# Patient Record
Sex: MALE | Race: ASIAN | NOT HISPANIC OR LATINO | Employment: FULL TIME | URBAN - METROPOLITAN AREA
[De-identification: names, ages, dates, MRNs, and addresses within clinical notes are randomized per-mention and may not be internally consistent; named-entity substitution may affect disease eponyms.]

---

## 2017-01-23 ENCOUNTER — LAB VISIT (OUTPATIENT)
Dept: LAB | Facility: HOSPITAL | Age: 67
End: 2017-01-23
Attending: FAMILY MEDICINE
Payer: MEDICARE

## 2017-01-23 DIAGNOSIS — N41.9 PROSTATITIS, UNSPECIFIED PROSTATITIS TYPE: ICD-10-CM

## 2017-01-23 LAB
BILIRUB UR QL STRIP: NEGATIVE
CLARITY UR: CLEAR
COLOR UR: YELLOW
GLUCOSE UR QL STRIP: NEGATIVE
HGB UR QL STRIP: NEGATIVE
KETONES UR QL STRIP: ABNORMAL
LEUKOCYTE ESTERASE UR QL STRIP: NEGATIVE
NITRITE UR QL STRIP: NEGATIVE
PH UR STRIP: 6 [PH] (ref 5–8)
PROT UR QL STRIP: NEGATIVE
SP GR UR STRIP: 1.02 (ref 1–1.03)
URN SPEC COLLECT METH UR: ABNORMAL

## 2017-01-23 PROCEDURE — 81003 URINALYSIS AUTO W/O SCOPE: CPT | Mod: PO

## 2017-01-23 PROCEDURE — 87086 URINE CULTURE/COLONY COUNT: CPT

## 2017-01-23 PROCEDURE — 87077 CULTURE AEROBIC IDENTIFY: CPT

## 2017-01-23 PROCEDURE — 87088 URINE BACTERIA CULTURE: CPT

## 2017-01-23 PROCEDURE — 87186 SC STD MICRODIL/AGAR DIL: CPT

## 2017-01-25 LAB — BACTERIA UR CULT: NORMAL

## 2017-01-26 ENCOUNTER — PATIENT MESSAGE (OUTPATIENT)
Dept: INTERNAL MEDICINE | Facility: CLINIC | Age: 67
End: 2017-01-26

## 2017-01-27 ENCOUNTER — PATIENT MESSAGE (OUTPATIENT)
Dept: INTERNAL MEDICINE | Facility: CLINIC | Age: 67
End: 2017-01-27

## 2017-01-27 DIAGNOSIS — N39.0 URINARY TRACT INFECTION WITHOUT HEMATURIA, SITE UNSPECIFIED: Primary | ICD-10-CM

## 2017-01-27 RX ORDER — AMOXICILLIN 875 MG/1
875 TABLET, FILM COATED ORAL 2 TIMES DAILY
Qty: 60 TABLET | Refills: 0 | Status: SHIPPED | OUTPATIENT
Start: 2017-01-27 | End: 2017-02-26

## 2017-01-27 NOTE — TELEPHONE ENCOUNTER
Please notify still urinary infxn evident resistant to cipro  Amoxicillin erxd (bacteria is enterococcus)  Aster ua cult one month

## 2017-02-06 RX ORDER — TADALAFIL 2.5 MG/1
1 TABLET ORAL DAILY
Qty: 90 EACH | Refills: 3 | Status: SHIPPED | OUTPATIENT
Start: 2017-02-06 | End: 2017-02-10 | Stop reason: SDUPTHER

## 2017-02-08 ENCOUNTER — PATIENT MESSAGE (OUTPATIENT)
Dept: INTERNAL MEDICINE | Facility: CLINIC | Age: 67
End: 2017-02-08

## 2017-02-10 RX ORDER — TADALAFIL 2.5 MG/1
1 TABLET ORAL DAILY
Qty: 90 EACH | Refills: 3 | Status: SHIPPED | OUTPATIENT
Start: 2017-02-10 | End: 2019-08-15

## 2017-02-27 ENCOUNTER — LAB VISIT (OUTPATIENT)
Dept: LAB | Facility: HOSPITAL | Age: 67
End: 2017-02-27
Attending: FAMILY MEDICINE
Payer: MEDICARE

## 2017-02-27 DIAGNOSIS — N39.0 URINARY TRACT INFECTION WITHOUT HEMATURIA, SITE UNSPECIFIED: ICD-10-CM

## 2017-02-27 LAB
BILIRUB UR QL STRIP: NEGATIVE
CLARITY UR: CLEAR
COLOR UR: YELLOW
GLUCOSE UR QL STRIP: NEGATIVE
HGB UR QL STRIP: NEGATIVE
KETONES UR QL STRIP: ABNORMAL
LEUKOCYTE ESTERASE UR QL STRIP: NEGATIVE
NITRITE UR QL STRIP: NEGATIVE
PH UR STRIP: 6 [PH] (ref 5–8)
PROT UR QL STRIP: ABNORMAL
SP GR UR STRIP: 1.02 (ref 1–1.03)
URN SPEC COLLECT METH UR: ABNORMAL

## 2017-02-27 PROCEDURE — 81003 URINALYSIS AUTO W/O SCOPE: CPT | Mod: PO

## 2017-02-27 PROCEDURE — 87086 URINE CULTURE/COLONY COUNT: CPT

## 2017-02-28 LAB — BACTERIA UR CULT: NO GROWTH

## 2017-04-10 ENCOUNTER — LAB VISIT (OUTPATIENT)
Dept: LAB | Facility: HOSPITAL | Age: 67
End: 2017-04-10
Attending: FAMILY MEDICINE
Payer: MEDICARE

## 2017-04-10 DIAGNOSIS — D50.9 IRON DEFICIENCY ANEMIA, UNSPECIFIED: ICD-10-CM

## 2017-04-10 DIAGNOSIS — E55.9 VITAMIN D DEFICIENCY: ICD-10-CM

## 2017-04-10 LAB
25(OH)D3+25(OH)D2 SERPL-MCNC: 18 NG/ML
BASOPHILS # BLD AUTO: 0.04 K/UL
BASOPHILS NFR BLD: 0.6 %
DIFFERENTIAL METHOD: ABNORMAL
EOSINOPHIL # BLD AUTO: 0.2 K/UL
EOSINOPHIL NFR BLD: 3.3 %
ERYTHROCYTE [DISTWIDTH] IN BLOOD BY AUTOMATED COUNT: 14.8 %
FERRITIN SERPL-MCNC: 17 NG/ML
HCT VFR BLD AUTO: 40.8 %
HGB BLD-MCNC: 13.3 G/DL
LYMPHOCYTES # BLD AUTO: 2.8 K/UL
LYMPHOCYTES NFR BLD: 44.1 %
MCH RBC QN AUTO: 25.1 PG
MCHC RBC AUTO-ENTMCNC: 32.6 %
MCV RBC AUTO: 77 FL
MONOCYTES # BLD AUTO: 0.5 K/UL
MONOCYTES NFR BLD: 7.1 %
NEUTROPHILS # BLD AUTO: 2.8 K/UL
NEUTROPHILS NFR BLD: 44.9 %
PLATELET # BLD AUTO: 171 K/UL
PMV BLD AUTO: 10.8 FL
RBC # BLD AUTO: 5.29 M/UL
WBC # BLD AUTO: 6.31 K/UL

## 2017-04-10 PROCEDURE — 85025 COMPLETE CBC W/AUTO DIFF WBC: CPT

## 2017-04-10 PROCEDURE — 82306 VITAMIN D 25 HYDROXY: CPT

## 2017-04-10 PROCEDURE — 82728 ASSAY OF FERRITIN: CPT

## 2017-04-10 PROCEDURE — 36415 COLL VENOUS BLD VENIPUNCTURE: CPT | Mod: PO

## 2017-07-11 ENCOUNTER — OFFICE VISIT (OUTPATIENT)
Dept: INTERNAL MEDICINE | Facility: CLINIC | Age: 67
End: 2017-07-11
Payer: MEDICARE

## 2017-07-11 VITALS
WEIGHT: 195.75 LBS | DIASTOLIC BLOOD PRESSURE: 78 MMHG | SYSTOLIC BLOOD PRESSURE: 124 MMHG | OXYGEN SATURATION: 98 % | HEIGHT: 69 IN | TEMPERATURE: 96 F | HEART RATE: 75 BPM | BODY MASS INDEX: 28.99 KG/M2

## 2017-07-11 DIAGNOSIS — E55.9 VITAMIN D DEFICIENCY: ICD-10-CM

## 2017-07-11 DIAGNOSIS — Z12.5 SCREENING FOR PROSTATE CANCER: ICD-10-CM

## 2017-07-11 DIAGNOSIS — M25.562 LEFT KNEE PAIN, UNSPECIFIED CHRONICITY: ICD-10-CM

## 2017-07-11 DIAGNOSIS — D50.9 IRON DEFICIENCY ANEMIA, UNSPECIFIED: ICD-10-CM

## 2017-07-11 DIAGNOSIS — I10 ESSENTIAL HYPERTENSION: Primary | ICD-10-CM

## 2017-07-11 DIAGNOSIS — E78.5 HYPERLIPIDEMIA, UNSPECIFIED HYPERLIPIDEMIA TYPE: ICD-10-CM

## 2017-07-11 PROCEDURE — 99214 OFFICE O/P EST MOD 30 MIN: CPT | Mod: S$PBB,,, | Performed by: FAMILY MEDICINE

## 2017-07-11 PROCEDURE — 99213 OFFICE O/P EST LOW 20 MIN: CPT | Mod: PBBFAC,PO | Performed by: FAMILY MEDICINE

## 2017-07-11 PROCEDURE — 1126F AMNT PAIN NOTED NONE PRSNT: CPT | Mod: ,,, | Performed by: FAMILY MEDICINE

## 2017-07-11 PROCEDURE — 99999 PR PBB SHADOW E&M-EST. PATIENT-LVL III: CPT | Mod: PBBFAC,,, | Performed by: FAMILY MEDICINE

## 2017-07-11 PROCEDURE — 1159F MED LIST DOCD IN RCRD: CPT | Mod: ,,, | Performed by: FAMILY MEDICINE

## 2017-07-11 RX ORDER — ESOMEPRAZOLE MAGNESIUM 40 MG/1
40 CAPSULE, DELAYED RELEASE ORAL
Qty: 90 CAPSULE | Refills: 3 | Status: SHIPPED | OUTPATIENT
Start: 2017-07-11 | End: 2018-09-11 | Stop reason: SDUPTHER

## 2017-07-11 RX ORDER — TAMSULOSIN HYDROCHLORIDE 0.4 MG/1
0.4 CAPSULE ORAL DAILY
Qty: 90 CAPSULE | Refills: 3 | Status: SHIPPED | OUTPATIENT
Start: 2017-07-11 | End: 2018-09-11 | Stop reason: SDUPTHER

## 2017-07-11 RX ORDER — LOSARTAN POTASSIUM 50 MG/1
50 TABLET ORAL DAILY
Qty: 90 TABLET | Refills: 3 | Status: SHIPPED | OUTPATIENT
Start: 2017-07-11 | End: 2018-09-11 | Stop reason: SDUPTHER

## 2017-07-11 RX ORDER — PRAVASTATIN SODIUM 20 MG/1
20 TABLET ORAL DAILY
Qty: 90 TABLET | Refills: 3 | Status: SHIPPED | OUTPATIENT
Start: 2017-07-11 | End: 2018-09-11 | Stop reason: SDUPTHER

## 2017-07-11 RX ORDER — MOMETASONE FUROATE 50 UG/1
2 SPRAY, METERED NASAL DAILY
Qty: 3 EACH | Refills: 3 | Status: SHIPPED | OUTPATIENT
Start: 2017-07-11 | End: 2018-09-11

## 2017-07-11 RX ORDER — DILTIAZEM HYDROCHLORIDE 240 MG/1
240 CAPSULE, EXTENDED RELEASE ORAL DAILY
Qty: 90 CAPSULE | Refills: 3 | Status: SHIPPED | OUTPATIENT
Start: 2017-07-11 | End: 2018-09-11 | Stop reason: SDUPTHER

## 2017-07-11 NOTE — PROGRESS NOTES
Subjective:       Patient ID: Eduardo Krishnamurthy is a . male.    Chief Complaint:Multiple issues see below      HPI Hypertension: blood pressures at home normal. Tolerating medicine.   Iron def anemia sec jejunal erosions off nsaids ;;  GERD asympt. Tolerating medication. No swallowing problems  All rhin: infreq nasonex and zyrtec  Vit d defic: off weekly vit d ;wants to do otc. D/wd inc to 2000 daily    Prostatitis sympt resolved    Some wt gain /left knee pain and fatigue but rsume stationary bike few weeks ago and already improving    Past Medical History   Diagnosis Date    Hypertension     Hyperlipidemia     GERD (gastroesophageal reflux disease)     BPH (benign prostatic hyperplasia)     Colon polyp     Iron deficiency anemia, unspecified      jejunal etc erosions 4/15     Past Surgical History   Procedure Laterality Date    Knee surgery       right     History reviewed. No pertinent family history.      Review of Systems  Cardiovascular: no chest pain  Chest: no shortness of breath  Abd: no abd pain  Remainder review of systems negative    Objective:      Physical Exam   Constitutional: He is oriented to person, place, and time. He appears well-developed and well-nourished.   HENT:   Head: Normocephalic and atraumatic.   Right Ear: External ear normal.   Left Ear: External ear normal.   Nose: Nose normal.   Mouth/Throat: Oropharynx is clear and moist.   Nl tms   Eyes: Conjunctivae and EOM are normal. Pupils are equal, round, and reactive to light. No scleral icterus.   Neck: Normal range of motion. Neck supple. Carotid bruit is not present.   Cardiovascular: Normal rate, regular rhythm and normal heart sounds.  Exam reveals no gallop and no friction rub.    No murmur heard.  Pulmonary/Chest: Effort normal and breath sounds normal. He has no wheezes.   Abdominal: Soft. Bowel sounds are normal. He exhibits no distension and no mass. There is no hepatosplenomegaly. There is no tenderness. There is no rebound  and no guarding.   Musculoskeletal: Normal range of motion. He exhibits no tenderness.   Lymphadenopathy:     He has no cervical adenopathy.   Neurological: He is alert and oriented to person, place, and time. No cranial nerve deficit. Coordination normal.   Skin: Skin is warm and dry. No rash noted. No erythema.   Psychiatric: He has a normal mood and affect. His behavior is normal. Judgment and thought content normal.   Nursing note and vitals reviewed.  bilat knees no effusion. Nl rom. nontend   Assessment:       1. Left knee pain   2. Iron deficiency anemia    3. Hyperlipidemia    4. Essential hypertension        Plan:     Increase vitamin D over counter to 2000 units daily  Shingles vaccine via pharmacy    **F/u per lab few months  Cont exer and notify if not back to nl   Essential hypertension  -     Lipid panel; Future; Expected date: 10/09/2017  -     Basic metabolic panel; Future; Expected date: 10/09/2017    Hyperlipidemia, unspecified hyperlipidemia type  -     pravastatin (PRAVACHOL) 20 MG tablet; Take 1 tablet (20 mg total) by mouth once daily.  Dispense: 90 tablet; Refill: 3    Iron deficiency anemia, unspecified  -     CBC auto differential; Future; Expected date: 07/11/2017  -     Ferritin; Future; Expected date: 07/11/2017    Left knee pain, unspecified chronicity    Screening for prostate cancer  -     PSA, Screening; Future; Expected date: 10/09/2017    Vitamin D deficiency  -     Vitamin D; Future; Expected date: 10/09/2017    Other orders  -     tamsulosin (FLOMAX) 0.4 mg Cp24; Take 1 capsule (0.4 mg total) by mouth once daily.  Dispense: 90 capsule; Refill: 3  -     mometasone (NASONEX) 50 mcg/actuation nasal spray; 2 sprays by Nasal route once daily.  Dispense: 3 each; Refill: 3  -     losartan (COZAAR) 50 MG tablet; Take 1 tablet (50 mg total) by mouth once daily.  Dispense: 90 tablet; Refill: 3  -     esomeprazole (NEXIUM) 40 MG capsule; Take 1 capsule (40 mg total) by mouth before  breakfast.  Dispense: 90 capsule; Refill: 3  -     diltiaZEM (TIAZAC) 240 MG CpSR; Take 1 capsule (240 mg total) by mouth once daily.  Dispense: 90 capsule; Refill: 3

## 2017-09-29 ENCOUNTER — PATIENT MESSAGE (OUTPATIENT)
Dept: INTERNAL MEDICINE | Facility: CLINIC | Age: 67
End: 2017-09-29

## 2017-09-29 DIAGNOSIS — M25.569 KNEE PAIN, UNSPECIFIED CHRONICITY, UNSPECIFIED LATERALITY: Primary | ICD-10-CM

## 2017-10-11 ENCOUNTER — LAB VISIT (OUTPATIENT)
Dept: LAB | Facility: HOSPITAL | Age: 67
End: 2017-10-11
Attending: FAMILY MEDICINE
Payer: MEDICARE

## 2017-10-11 DIAGNOSIS — Z12.5 SCREENING FOR PROSTATE CANCER: ICD-10-CM

## 2017-10-11 DIAGNOSIS — D50.9 IRON DEFICIENCY ANEMIA: ICD-10-CM

## 2017-10-11 DIAGNOSIS — E55.9 VITAMIN D DEFICIENCY: ICD-10-CM

## 2017-10-11 DIAGNOSIS — I10 ESSENTIAL HYPERTENSION: ICD-10-CM

## 2017-10-11 LAB
25(OH)D3+25(OH)D2 SERPL-MCNC: 20 NG/ML
ANION GAP SERPL CALC-SCNC: 6 MMOL/L
BASOPHILS # BLD AUTO: 0.04 K/UL
BASOPHILS NFR BLD: 0.7 %
BUN SERPL-MCNC: 10 MG/DL
CALCIUM SERPL-MCNC: 9.2 MG/DL
CHLORIDE SERPL-SCNC: 104 MMOL/L
CHOLEST SERPL-MCNC: 166 MG/DL
CHOLEST/HDLC SERPL: 2.9 {RATIO}
CO2 SERPL-SCNC: 26 MMOL/L
COMPLEXED PSA SERPL-MCNC: 1 NG/ML
CREAT SERPL-MCNC: 1 MG/DL
DIFFERENTIAL METHOD: ABNORMAL
EOSINOPHIL # BLD AUTO: 0.1 K/UL
EOSINOPHIL NFR BLD: 2.1 %
ERYTHROCYTE [DISTWIDTH] IN BLOOD BY AUTOMATED COUNT: 15.1 %
EST. GFR  (AFRICAN AMERICAN): >60 ML/MIN/1.73 M^2
EST. GFR  (NON AFRICAN AMERICAN): >60 ML/MIN/1.73 M^2
FERRITIN SERPL-MCNC: 20 NG/ML
GLUCOSE SERPL-MCNC: 91 MG/DL
HCT VFR BLD AUTO: 40.9 %
HDLC SERPL-MCNC: 57 MG/DL
HDLC SERPL: 34.3 %
HGB BLD-MCNC: 13.3 G/DL
LDLC SERPL CALC-MCNC: 84 MG/DL
LYMPHOCYTES # BLD AUTO: 2.5 K/UL
LYMPHOCYTES NFR BLD: 44.4 %
MCH RBC QN AUTO: 24.9 PG
MCHC RBC AUTO-ENTMCNC: 32.5 G/DL
MCV RBC AUTO: 76 FL
MONOCYTES # BLD AUTO: 0.5 K/UL
MONOCYTES NFR BLD: 8.2 %
NEUTROPHILS # BLD AUTO: 2.5 K/UL
NEUTROPHILS NFR BLD: 44.6 %
NONHDLC SERPL-MCNC: 109 MG/DL
PLATELET # BLD AUTO: 189 K/UL
PMV BLD AUTO: 11 FL
POTASSIUM SERPL-SCNC: 4.6 MMOL/L
RBC # BLD AUTO: 5.35 M/UL
SODIUM SERPL-SCNC: 136 MMOL/L
TRIGL SERPL-MCNC: 125 MG/DL
WBC # BLD AUTO: 5.61 K/UL

## 2017-10-11 PROCEDURE — 85025 COMPLETE CBC W/AUTO DIFF WBC: CPT

## 2017-10-11 PROCEDURE — 80061 LIPID PANEL: CPT

## 2017-10-11 PROCEDURE — 84153 ASSAY OF PSA TOTAL: CPT

## 2017-10-11 PROCEDURE — 82306 VITAMIN D 25 HYDROXY: CPT

## 2017-10-11 PROCEDURE — 36415 COLL VENOUS BLD VENIPUNCTURE: CPT | Mod: PO

## 2017-10-11 PROCEDURE — 82728 ASSAY OF FERRITIN: CPT

## 2017-10-11 PROCEDURE — 80048 BASIC METABOLIC PNL TOTAL CA: CPT

## 2017-10-25 DIAGNOSIS — M25.562 ACUTE PAIN OF LEFT KNEE: Primary | ICD-10-CM

## 2017-10-26 ENCOUNTER — OFFICE VISIT (OUTPATIENT)
Dept: ORTHOPEDICS | Facility: CLINIC | Age: 67
End: 2017-10-26
Payer: MEDICARE

## 2017-10-26 ENCOUNTER — HOSPITAL ENCOUNTER (OUTPATIENT)
Dept: RADIOLOGY | Facility: HOSPITAL | Age: 67
Discharge: HOME | End: 2017-10-26
Attending: ORTHOPAEDIC SURGERY
Payer: MEDICARE

## 2017-10-26 VITALS
RESPIRATION RATE: 17 BRPM | DIASTOLIC BLOOD PRESSURE: 73 MMHG | HEIGHT: 69 IN | HEART RATE: 55 BPM | SYSTOLIC BLOOD PRESSURE: 106 MMHG | WEIGHT: 189 LBS | BODY MASS INDEX: 27.99 KG/M2

## 2017-10-26 DIAGNOSIS — M25.462 BILATERAL KNEE EFFUSIONS: ICD-10-CM

## 2017-10-26 DIAGNOSIS — M25.561 CHRONIC PAIN OF BOTH KNEES: Primary | ICD-10-CM

## 2017-10-26 DIAGNOSIS — M25.461 BILATERAL KNEE EFFUSIONS: ICD-10-CM

## 2017-10-26 DIAGNOSIS — M65.9 SYNOVITIS OF KNEE: ICD-10-CM

## 2017-10-26 DIAGNOSIS — M17.9 OSTEOARTHRITIS OF KNEE, UNSPECIFIED (CODE): ICD-10-CM

## 2017-10-26 DIAGNOSIS — M17.0 BILATERAL PRIMARY OSTEOARTHRITIS OF KNEE: ICD-10-CM

## 2017-10-26 DIAGNOSIS — M25.562 CHRONIC PAIN OF BOTH KNEES: Primary | ICD-10-CM

## 2017-10-26 DIAGNOSIS — M25.562 ACUTE PAIN OF LEFT KNEE: ICD-10-CM

## 2017-10-26 DIAGNOSIS — G89.29 CHRONIC PAIN OF BOTH KNEES: Primary | ICD-10-CM

## 2017-10-26 PROCEDURE — 99213 OFFICE O/P EST LOW 20 MIN: CPT | Mod: PBBFAC,25,PO | Performed by: ORTHOPAEDIC SURGERY

## 2017-10-26 PROCEDURE — 99204 OFFICE O/P NEW MOD 45 MIN: CPT | Mod: S$PBB,,, | Performed by: ORTHOPAEDIC SURGERY

## 2017-10-26 PROCEDURE — 73562 X-RAY EXAM OF KNEE 3: CPT | Mod: TC,50,PO

## 2017-10-26 PROCEDURE — 99999 PR PBB SHADOW E&M-EST. PATIENT-LVL III: CPT | Mod: PBBFAC,,, | Performed by: ORTHOPAEDIC SURGERY

## 2017-10-26 PROCEDURE — 73562 X-RAY EXAM OF KNEE 3: CPT | Mod: 26,50,, | Performed by: RADIOLOGY

## 2017-10-26 RX ORDER — MELOXICAM 15 MG/1
15 TABLET ORAL DAILY
Qty: 30 TABLET | Refills: 2 | Status: SHIPPED | OUTPATIENT
Start: 2017-10-26 | End: 2018-02-22 | Stop reason: SDUPTHER

## 2017-10-26 NOTE — LETTER
October 26, 2017      Shreyas Duval MD  [address]  [place] [address]           [facility]  [address] [place] [address]  Phone: [phone]  Fax: [phone]          Patient: Eduardo Krishnamurthy   MR Number: [id number]   YOB: 1950   Date of Visit: 10/26/2017       Dear Dr. Shreyas Duval:    Thank you for referring Eduardo Krishnamurthy to me for evaluation. Attached you will find relevant portions of my assessment and plan of care.    If you have questions, please do not hesitate to call me. I look forward to following Eduardo Krishnamurthy along with you.    Sincerely,    Laura Mckeon LPN    Enclosure  CC:  No Recipients    If you would like to receive this communication electronically, please contact [email or web address] or [phone] to request more information on [email or web address] Link access.    For providers and/or their staff who would like to refer a patient to [facility], please contact us through our one-stop-shop provider referral line, [facility], at [phone].    If you feel you have received this communication in error or would no longer like to receive these types of communications, please e-mail [email or web address]

## 2017-10-27 PROBLEM — M25.462 BILATERAL KNEE EFFUSIONS: Status: ACTIVE | Noted: 2017-10-27

## 2017-10-27 PROBLEM — M25.561 CHRONIC PAIN OF BOTH KNEES: Status: ACTIVE | Noted: 2017-10-27

## 2017-10-27 PROBLEM — M25.461 BILATERAL KNEE EFFUSIONS: Status: ACTIVE | Noted: 2017-10-27

## 2017-10-27 PROBLEM — G89.29 CHRONIC PAIN OF BOTH KNEES: Status: ACTIVE | Noted: 2017-10-27

## 2017-10-27 PROBLEM — M17.0 BILATERAL PRIMARY OSTEOARTHRITIS OF KNEE: Status: ACTIVE | Noted: 2017-10-27

## 2017-10-27 PROBLEM — M25.562 CHRONIC PAIN OF BOTH KNEES: Status: ACTIVE | Noted: 2017-10-27

## 2017-10-27 PROBLEM — M17.9 OSTEOARTHRITIS OF KNEE, UNSPECIFIED (CODE): Status: ACTIVE | Noted: 2017-10-27

## 2017-10-27 PROBLEM — M65.9 SYNOVITIS OF KNEE: Status: ACTIVE | Noted: 2017-10-27

## 2017-10-27 NOTE — PROGRESS NOTES
CC: This is a 66-year-old male that complains of knee pain.    HPI: The patient has a long history of bilateral knee pain.  He does have a history of arthritis as well.  He has taken anti-inflammatories in the past.  He denies any complications related to taking Mobic.  The patient states that the pain is a 7 out of 10.    PMH:    Past Medical History:   Diagnosis Date    BPH (benign prostatic hyperplasia)     Colon polyp     GERD (gastroesophageal reflux disease)     Hyperlipidemia     Hypertension     Iron deficiency anemia, unspecified     jejunal etc erosions 4/15       PSH:    Past Surgical History:   Procedure Laterality Date    KNEE SURGERY      right       Family Hx:  No family history on file.    Allergy:    Review of patient's allergies indicates:   Allergen Reactions    Nsaids (non-steroidal anti-inflammatory drug)      Jejunal erosions       Medication:    Current Outpatient Prescriptions:     diltiaZEM (TIAZAC) 240 MG CpSR, Take 1 capsule (240 mg total) by mouth once daily., Disp: 90 capsule, Rfl: 3    ergocalciferol (VITAMIN D2) 50,000 unit Cap, Take 50,000 Units by mouth every 7 days., Disp: , Rfl:     esomeprazole (NEXIUM) 40 MG capsule, Take 1 capsule (40 mg total) by mouth before breakfast., Disp: 90 capsule, Rfl: 3    ferrous gluconate (FERGON) 325 MG Tab, Take 1 tablet (325 mg total) by mouth daily with breakfast., Disp: 90 tablet, Rfl: 1    losartan (COZAAR) 50 MG tablet, Take 1 tablet (50 mg total) by mouth once daily., Disp: 90 tablet, Rfl: 3    mometasone (NASONEX) 50 mcg/actuation nasal spray, 2 sprays by Nasal route once daily., Disp: 3 each, Rfl: 3    pravastatin (PRAVACHOL) 20 MG tablet, Take 1 tablet (20 mg total) by mouth once daily., Disp: 90 tablet, Rfl: 3    tadalafil (CIALIS) 2.5 mg Tab, Take 1 tablet by mouth once daily., Disp: 90 each, Rfl: 3    tamsulosin (FLOMAX) 0.4 mg Cp24, Take 1 capsule (0.4 mg total) by mouth once daily., Disp: 90 capsule, Rfl: 3     "meloxicam (MOBIC) 15 MG tablet, Take 1 tablet (15 mg total) by mouth once daily., Disp: 30 tablet, Rfl: 2    Current Facility-Administered Medications:     hylan g-f 20 48 mg/6 mL injection 48 mg, 48 mg, Intra-articular, 1 time in Clinic/HOD, Troy Becerra Sr., MD    hylan g-f 20 48 mg/6 mL injection 48 mg, 48 mg, Intra-articular, 1 time in Clinic/HOD, Troy Becerra Sr., MD    Social History:    Social History     Social History    Marital status:      Spouse name: N/A    Number of children: 3    Years of education: N/A     Occupational History    Not on file.     Social History Main Topics    Smoking status: Never Smoker    Smokeless tobacco: Never Used    Alcohol use No    Drug use: No    Sexual activity: No     Other Topics Concern    Not on file     Social History Narrative    [place] moved here 2014;         Vitals:   /73   Pulse (!) 55   Resp 17   Ht 5' 9" (1.753 m)   Wt 85.7 kg (189 lb)   BMI 27.91 kg/m²      ROS:  GENERAL: No fever, chills, fatigability or weight loss.  SKIN: No rashes, itching or changes in color or texture of skin.  HEAD: No headaches or recent head trauma.  EYES: Visual acuity fine. No photophobia, ocular pain or diplopia.  EARS: Denies ear pain, discharge or vertigo.  NOSE: No loss of smell, no epistaxis or postnasal drip.  MOUTH & THROAT: No hoarseness or change in voice. No excessive gum bleeding.  NODES: Denies swollen glands.  CHEST: Denies BISHOP, cyanosis, wheezing, cough and sputum production.  CARDIOVASCULAR: Denies chest pain, PND, orthopnea or reduced exercise tolerance.  ABDOMEN: Appetite fine. No weight loss. Denies diarrhea, abdominal pain, hematemesis or blood in stool.  URINARY: No flank pain, dysuria or hematuria.  PERIPHERAL VASCULAR: No claudication or cyanosis.  NEUROLOGIC: No history of seizures, paralysis, alteration of gait or coordination.  MUSCULOSKELETAL: See HPI    PE:  APPEARANCE: Well nourished, well developed, " in no acute distress.   HEAD: Normocephalic, atraumatic.  EYES: PERRL. EOMI.   EARS: TM's intact. Light reflex normal. No retraction or perforation.   NOSE: Mucosa pink. Airway clear.  MOUTH & THROAT: No tonsillar enlargement. No pharyngeal erythema or exudate. No stridor.  NECK: Supple.   NODES: No cervical, axillary or inguinal lymph node enlargement.  CHEST: Lungs clear to auscultation.  CARDIOVASCULAR: Normal S1, S2. No rubs, murmurs or gallops.  ABDOMEN: Bowel sounds normal. Not distended. Soft. No tenderness or masses.  NEUROLOGIC: Cranial Nerves: II-XII grossly intact, also see MUSCULOSKELETAL  MUSCULOSKELETAL:        left Knee Exam-abnormal    Gait-abnormal  Muscle Appearance:abnormal  Grooming:normal  Spine Alignment-normal  Muscle Atrophy-Positive  Deformities-Negative  Tenderness-Positive  Paresthesias-Negative  Range of Motion         Ext-normal, 0 degrees         Flex-abnormal  Muscle Strength-abnormal  Sensation-normal  Reflexes-normal  Crepitus-Positive                                Swelling-Negative  Effusion- Positive                                Edema-Negative  Lachman-Negative                                Erythema-Negative  Rolanda's-Positive                              Apley Grind-Positive  Patellar Comp-Positive                         Alignment-normal/symmetric  Patellar Apprehension-Negative              Synovial fullness-Positive  Passive Patellar Tilt-normal  Patellar Tracking-normal   Patellar Glide-normal  Q-Angle at 90 degrees-normal  Patellar Grind-abnormal  [id number]  Fatigue-Negative                                     HS Tightness-Negative  Tests on Exam, No ligamentous laxity  Neurovascular Status-normal+2 DP and PT artery pulses  Skin-normal         Right  Knee Exam-abnormal    Gait-abnormal  Muscle Appearance:abnormal  Grooming:normal  Spine Alignment-normal  Muscle Atrophy-Positive  Deformities-Negative  Tenderness-Positive  Paresthesias-Negative  Range of Motion          Ext-normal, 0 degrees         Flex-abnormal  Muscle Strength-abnormal  Sensation-normal  Reflexes-normal  Crepitus-Positive                                Swelling-Negative  Effusion- Positive                                Edema-Negative  Lachman-Negative                                Erythema-Negative  Rolanda's-Positive                              Apley Grind-Positive  Patellar Comp-Positive                         Alignment-normal/symmetric  Patellar Apprehension-Negative              Synovial fullness-Positive  Passive Patellar Tilt-normal  Patellar Tracking-normal   Patellar Glide-normal  Q-Angle at 90 degrees-normal  Patellar Grind-abnormal  [id number]  Fatigue-Negative                                     HS Tightness-Negative  Tests on Exam, No ligamentous laxity  Neurovascular Status-normal+2 DP and PT artery pulses  Skin-normal    Assessment:           Diagnosis:              1.  Bilateral knee arthritis               2.  Bilateral knee effusion               3.  Bilateral knee synovitis                   Diagnostic Studies  MRI-No  X-Ray-No  EMG/NCV-No  Arthrogram-No  Bone Scan-No  CT Scan-No  Doppler-No  ESR-No  CRP-No  CBC with Diff-No   Rheumatoid/Arthritis Panel-No      Plan:                                                 1. PT-yes                                                 2.OT-no                                          3.NSAID-yes                                        4. Narcotics-no                                     5. Wound care-No                                 6. Rest-no                                           7. Surgery-no                                         8. KADI Hose-no                                    9. Anticoagulation therapy-no               10. Elevation-no                                     11. Crutches-no                                    12. Walker-no             13. Cane yes                        14. Referral-no                                      15.Injection-no                            16. Splint   /    Cast   /   Cast Shoe-Yes              17. RICE-none            18. Follow up-  2 months, upon return from [facility].

## 2017-10-27 NOTE — PATIENT INSTRUCTIONS
Arthralgia    Arthralgia is the term for pain in or around the joint. It is a symptom, not a disease. This pain may involve one or more joints. In some cases, the pain moves from joint to joint.  There are many causes for joint pain. These include:  · Injury  · Osteoarthritis (wearing out of the joint surface)  · Gout (inflammation of the joint due to crystals in the joint fluid)  · Infection inside the joint    · Bursitis (inflammation of the fluid-filled sacs around the joint)  · Autoimmune disorders such as rheumatoid arthritis or lupus  · Tendonitis (inflammation of chords that attach muscle to bone)  Home care  · Rest the involved joint(s) until your symptoms improve.   · You may be prescribed pain medicine. If none is prescribed, you may use acetaminophen or ibuprofen to control pain and inflammation.  Follow-up care  Follow up with your healthcare provider or as advised.  When to seek medical advice  Contact your healthcare provider right away if any of the following occurs:  · Pain, swelling, or redness of joint increases  · Pain worsens or recurs after a period of improvement  · Pain moves to other joints  · You cannot bear weight on the affected joint   · You cannot move the affected joint  · Joint appears deformed  · New rash appears  · Fever of 100.4ºF (38ºC) or higher, or as directed by your healthcare provider  Date Last Reviewed: 3/1/2017  © 2643-0246 [employer]. [address] [place] [address], [place] [address]. All rights reserved. This information is not intended as a substitute for professional medical care. Always follow your healthcare professional's instructions.

## 2017-11-07 ENCOUNTER — OFFICE VISIT (OUTPATIENT)
Dept: ORTHOPEDICS | Facility: CLINIC | Age: 67
End: 2017-11-07
Payer: MEDICARE

## 2017-11-07 VITALS
HEART RATE: 68 BPM | WEIGHT: 188.94 LBS | BODY MASS INDEX: 27.98 KG/M2 | DIASTOLIC BLOOD PRESSURE: 71 MMHG | SYSTOLIC BLOOD PRESSURE: 122 MMHG | HEIGHT: 69 IN | RESPIRATION RATE: 18 BRPM

## 2017-11-07 DIAGNOSIS — M25.462 BILATERAL KNEE EFFUSIONS: ICD-10-CM

## 2017-11-07 DIAGNOSIS — M25.461 BILATERAL KNEE EFFUSIONS: ICD-10-CM

## 2017-11-07 DIAGNOSIS — M17.0 BILATERAL PRIMARY OSTEOARTHRITIS OF KNEE: Primary | ICD-10-CM

## 2017-11-07 PROCEDURE — 99214 OFFICE O/P EST MOD 30 MIN: CPT | Mod: 25,S$PBB,, | Performed by: ORTHOPAEDIC SURGERY

## 2017-11-07 PROCEDURE — 99213 OFFICE O/P EST LOW 20 MIN: CPT | Mod: PBBFAC,PO,25 | Performed by: ORTHOPAEDIC SURGERY

## 2017-11-07 PROCEDURE — 20610 DRAIN/INJ JOINT/BURSA W/O US: CPT | Mod: S$PBB,LT,, | Performed by: ORTHOPAEDIC SURGERY

## 2017-11-07 PROCEDURE — 99999 PR PBB SHADOW E&M-EST. PATIENT-LVL III: CPT | Mod: PBBFAC,,, | Performed by: ORTHOPAEDIC SURGERY

## 2017-11-07 PROCEDURE — 20610 DRAIN/INJ JOINT/BURSA W/O US: CPT | Mod: PBBFAC,PO | Performed by: ORTHOPAEDIC SURGERY

## 2017-11-07 RX ADMIN — Medication 48 MG: at 03:11

## 2017-11-07 NOTE — PROGRESS NOTES
CC: This is a 66-year-old male that complains of knee pain.    HPI: The patient has a long history of bilateral knee pain.  He does have a history of arthritis as well.  He has taken anti-inflammatories in the past.  He denies any complications related to taking Mobic.  The patient states that the pain is a 7 out of 10.    PMH:    Past Medical History:   Diagnosis Date    BPH (benign prostatic hyperplasia)     Colon polyp     GERD (gastroesophageal reflux disease)     Hyperlipidemia     Hypertension     Iron deficiency anemia, unspecified     jejunal etc erosions 4/15       PSH:    Past Surgical History:   Procedure Laterality Date    KNEE SURGERY      right       Family Hx:  History reviewed. No pertinent family history.    Allergy:    Review of patient's allergies indicates:   Allergen Reactions    Nsaids (non-steroidal anti-inflammatory drug)      Jejunal erosions       Medication:    Current Outpatient Prescriptions:     diltiaZEM (TIAZAC) 240 MG CpSR, Take 1 capsule (240 mg total) by mouth once daily., Disp: 90 capsule, Rfl: 3    ergocalciferol (VITAMIN D2) 50,000 unit Cap, Take 50,000 Units by mouth every 7 days., Disp: , Rfl:     esomeprazole (NEXIUM) 40 MG capsule, Take 1 capsule (40 mg total) by mouth before breakfast., Disp: 90 capsule, Rfl: 3    ferrous gluconate (FERGON) 325 MG Tab, Take 1 tablet (325 mg total) by mouth daily with breakfast., Disp: 90 tablet, Rfl: 1    losartan (COZAAR) 50 MG tablet, Take 1 tablet (50 mg total) by mouth once daily., Disp: 90 tablet, Rfl: 3    meloxicam (MOBIC) 15 MG tablet, Take 1 tablet (15 mg total) by mouth once daily., Disp: 30 tablet, Rfl: 2    mometasone (NASONEX) 50 mcg/actuation nasal spray, 2 sprays by Nasal route once daily., Disp: 3 each, Rfl: 3    pravastatin (PRAVACHOL) 20 MG tablet, Take 1 tablet (20 mg total) by mouth once daily., Disp: 90 tablet, Rfl: 3    tadalafil (CIALIS) 2.5 mg Tab, Take 1 tablet by mouth once daily., Disp: 90 each,  "Rfl: 3    tamsulosin (FLOMAX) 0.4 mg Cp24, Take 1 capsule (0.4 mg total) by mouth once daily., Disp: 90 capsule, Rfl: 3    Current Facility-Administered Medications:     hylan g-f 20 48 mg/6 mL injection 48 mg, 48 mg, Intra-articular, 1 time in Clinic/HOD, Troy Becerra Sr., MD    hylan g-f 20 48 mg/6 mL injection 48 mg, 48 mg, Intra-articular, 1 time in Clinic/HOD, Troy Becerra Sr., MD    Social History:    Social History     Social History    Marital status:      Spouse name: N/A    Number of children: 3    Years of education: N/A     Occupational History    Not on file.     Social History Main Topics    Smoking status: Never Smoker    Smokeless tobacco: Never Used    Alcohol use No    Drug use: No    Sexual activity: No     Other Topics Concern    Not on file     Social History Narrative    [place] here [place];         Vitals:   /71   Pulse 68   Resp 18   Ht 5' 9" (1.753 m)   Wt 85.7 kg (188 lb 15 oz)   BMI 27.90 kg/m²      ROS:  GENERAL: No fever, chills, fatigability or weight loss.  SKIN: No rashes, itching or changes in color or texture of skin.  HEAD: No headaches or recent head trauma.  EYES: Visual acuity fine. No photophobia, ocular pain or diplopia.  EARS: Denies ear pain, discharge or vertigo.  NOSE: No loss of smell, no epistaxis or postnasal drip.  MOUTH & THROAT: No hoarseness or change in voice. No excessive gum bleeding.  NODES: Denies swollen glands.  CHEST: Denies BISHOP, cyanosis, wheezing, cough and sputum production.  CARDIOVASCULAR: Denies chest pain, PND, orthopnea or reduced exercise tolerance.  ABDOMEN: Appetite fine. No weight loss. Denies diarrhea, abdominal pain, hematemesis or blood in stool.  URINARY: No flank pain, dysuria or hematuria.  PERIPHERAL VASCULAR: No claudication or cyanosis.  NEUROLOGIC: No history of seizures, paralysis, alteration of gait or coordination.  MUSCULOSKELETAL: See HPI    PE:  APPEARANCE: Well " nourished, well developed, in no acute distress.   HEAD: Normocephalic, atraumatic.  EYES: PERRL. EOMI.   EARS: TM's intact. Light reflex normal. No retraction or perforation.   NOSE: Mucosa pink. Airway clear.  MOUTH & THROAT: No tonsillar enlargement. No pharyngeal erythema or exudate. No stridor.  NECK: Supple.   NODES: No cervical, axillary or inguinal lymph node enlargement.  CHEST: Lungs clear to auscultation.  CARDIOVASCULAR: Normal S1, S2. No rubs, murmurs or gallops.  ABDOMEN: Bowel sounds normal. Not distended. Soft. No tenderness or masses.  NEUROLOGIC: Cranial Nerves: II-XII grossly intact, also see MUSCULOSKELETAL  MUSCULOSKELETAL:        left Knee Exam-abnormal    Gait-abnormal  Muscle Appearance:abnormal  Grooming:normal  Spine Alignment-normal  Muscle Atrophy-Positive  Deformities-Negative  Tenderness-Positive  Paresthesias-Negative  Range of Motion         Ext-normal, 0 degrees         Flex-abnormal  Muscle Strength-abnormal  Sensation-normal  Reflexes-normal  Crepitus-Positive                                Swelling-Negative  Effusion- Positive                                Edema-Negative  Lachman-Negative                                Erythema-Negative  Rolanda's-Positive                              Apley Grind-Positive  Patellar Comp-Positive                         Alignment-normal/symmetric  Patellar Apprehension-Negative              Synovial fullness-Positive  Passive Patellar Tilt-normal  Patellar Tracking-normal   Patellar Glide-normal  Q-Angle at 90 degrees-normal  Patellar Grind-abnormal  [id number]  Fatigue-Negative                                     HS Tightness-Negative  Tests on Exam, No ligamentous laxity  Neurovascular Status-normal+2 DP and PT artery pulses  Skin-normal         Right  Knee Exam-abnormal    Gait-abnormal  Muscle Appearance:abnormal  Grooming:normal  Spine Alignment-normal  Muscle  Atrophy-Positive  Deformities-Negative  Tenderness-Positive  Paresthesias-Negative  Range of Motion         Ext-normal, 0 degrees         Flex-abnormal  Muscle Strength-abnormal  Sensation-normal  Reflexes-normal  Crepitus-Positive                                Swelling-Negative  Effusion- Positive                                Edema-Negative  Lachman-Negative                                Erythema-Negative  [facility]'s-Positive                              Apley Grind-Positive  Patellar Comp-Positive                         Alignment-normal/symmetric  Patellar Apprehension-Negative              Synovial fullness-Positive  Passive Patellar Tilt-normal  Patellar Tracking-normal   Patellar Glide-normal  Q-Angle at 90 degrees-normal  Patellar Grind-abnormal  [id number]  Fatigue-Negative                                     HS Tightness-Negative  Tests on Exam, No ligamentous laxity  Neurovascular Status-normal+2 DP and PT artery pulses  Skin-normal    Assessment:  The patient only wanted his left knee injected with Synvisc.           Diagnosis:              1.  Bilateral knee arthritis               2.  Bilateral knee effusion               3.  Bilateral knee synovitis                   Diagnostic Studies  MRI-No  X-Ray-No  EMG/NCV-No  Arthrogram-No  Bone Scan-No  CT Scan-No  Doppler-No  ESR-No  CRP-No  CBC with Diff-No   Rheumatoid/Arthritis Panel-No      Plan:                                                 1. PT-yes                                                 2.OT-no                                          3.NSAID-yes                                        4. Narcotics-no                                     5. Wound care-No                                 6. Rest-no                                           7. Surgery-no                                         8. KADI Hose-no                                    9. Anticoagulation therapy-no               10. Elevation-no                                      11. Crutches-no                                    12. Walker-no             13. Cane yes                        14. Referral-no                                     15.Injection-no                            16. Splint   /    Cast   /   Cast Shoe-Yes              17. RICE-none            18. Follow up-  3 months    \  Injection:  The patient was placed in the supine position with   the left leg near   the end of the bed facing me.  The left knee   was placed over a nonsterile pad.The Knee was   prepped, sterily, with Alcohol and Betadine.  A  Refrigerant  And coolant was used to locally anesthetize the skin   over the superior lateral aspect of the knee.  A one and   a half inch, 27 gauge needle attached to   A 10 cc synringe was placed into the lateral joint.   A negative pressure was placed on the needle to   ensure the aspiration was placed into the joint  And not into a blood vessel.  48 mg of Synvisc one  injected.   The patient tolerated the knee injection well.  A Bandage   was placed over the injection site.

## 2017-11-07 NOTE — PATIENT INSTRUCTIONS
What is Arthritis?  Arthritis is a disease that affects the joints (the parts where bones meet and move). It can affect any joint in your body. There are many types of arthritis, including osteoarthritis and rheumatoid arthrtitis. If your symptoms are mild, medications may be enough to reduce pain and swelling. For more severe arthritis, surgery may be needed to improve the condition of the joint or replace the joint entirely.                  What causes arthritis?  Cartilage is a smooth substance that protects the ends of your bones and provides cushioning. When you have arthritis, this cartilage breaks down and can no longer protect your bones. The bones rub against each other, causing pain and swelling. Over time, bone spurs (small pieces of rough or splintered bone) may develop, and the joint's range of motion can become limited.  Symptoms  Some of the more common symptoms of arthritis include:  · Joint pain and stiffness. Pain and stiffness get worse with long periods of rest or using a joint too long or too hard.  · Joints that have lost normal shape and motion.  · Tender, inflamed joints. They may look red and feel warm.  · Grinding or popping noise with joint movement.   · Feeling tired all the time.  Reducing symptoms  Following a healthy lifestyle by losing weight and exercising can help reduce symptoms of osteoarthritis. Medicines can be very helpful for arthritis.     Date Last Reviewed: 9/10/2015  © 3448-1014 [employer]. [address] [place] [address], [place] [address]. All rights reserved. This information is not intended as a substitute for professional medical care. Always follow your healthcare professional's instructions.

## 2017-11-14 ENCOUNTER — TELEPHONE (OUTPATIENT)
Dept: INTERNAL MEDICINE | Facility: CLINIC | Age: 67
End: 2017-11-14

## 2017-11-14 DIAGNOSIS — D64.9 ANEMIA, UNSPECIFIED TYPE: ICD-10-CM

## 2017-11-14 DIAGNOSIS — E55.9 VITAMIN D DEFICIENCY: Primary | ICD-10-CM

## 2017-11-14 RX ORDER — ERGOCALCIFEROL 1.25 MG/1
50000 CAPSULE ORAL
Qty: 12 CAPSULE | Refills: 1 | Status: SHIPPED | OUTPATIENT
Start: 2017-11-14 | End: 2018-05-14 | Stop reason: SDUPTHER

## 2017-11-14 NOTE — TELEPHONE ENCOUNTER
----- Message from Shreyas Duval MD sent at 11/13/2017  6:43 PM CST -----  Iron level borderline and vit d still low  If he is ok trying vit D weekly rx then let me know and harpreet cbc, ferritin and vit d 3 months

## 2017-12-18 ENCOUNTER — TELEPHONE (OUTPATIENT)
Dept: ORTHOPEDICS | Facility: CLINIC | Age: 67
End: 2017-12-18

## 2017-12-18 NOTE — TELEPHONE ENCOUNTER
Pt contacted to schedule evaluation due to Synvisc recall.    [place] and would contact staff when he is able to schedule.

## 2018-01-26 ENCOUNTER — PATIENT MESSAGE (OUTPATIENT)
Dept: ORTHOPEDICS | Facility: CLINIC | Age: 68
End: 2018-01-26

## 2018-01-29 DIAGNOSIS — M25.561 ACUTE PAIN OF BOTH KNEES: Primary | ICD-10-CM

## 2018-01-29 DIAGNOSIS — M25.562 ACUTE PAIN OF BOTH KNEES: Primary | ICD-10-CM

## 2018-01-30 ENCOUNTER — HOSPITAL ENCOUNTER (OUTPATIENT)
Dept: RADIOLOGY | Facility: HOSPITAL | Age: 68
Discharge: HOME | End: 2018-01-30
Attending: ORTHOPAEDIC SURGERY
Payer: MEDICARE

## 2018-01-30 ENCOUNTER — OFFICE VISIT (OUTPATIENT)
Dept: ORTHOPEDICS | Facility: CLINIC | Age: 68
End: 2018-01-30
Payer: MEDICARE

## 2018-01-30 VITALS
WEIGHT: 188 LBS | HEIGHT: 69 IN | RESPIRATION RATE: 18 BRPM | HEART RATE: 70 BPM | SYSTOLIC BLOOD PRESSURE: 120 MMHG | BODY MASS INDEX: 27.85 KG/M2 | DIASTOLIC BLOOD PRESSURE: 82 MMHG

## 2018-01-30 DIAGNOSIS — M25.462 EFFUSION OF BOTH KNEE JOINTS: ICD-10-CM

## 2018-01-30 DIAGNOSIS — M25.562 ACUTE PAIN OF BOTH KNEES: ICD-10-CM

## 2018-01-30 DIAGNOSIS — M17.0 BILATERAL PRIMARY OSTEOARTHRITIS OF KNEE: Primary | ICD-10-CM

## 2018-01-30 DIAGNOSIS — M67.369: ICD-10-CM

## 2018-01-30 DIAGNOSIS — M25.561 ACUTE PAIN OF BOTH KNEES: ICD-10-CM

## 2018-01-30 DIAGNOSIS — M25.461 EFFUSION OF BOTH KNEE JOINTS: ICD-10-CM

## 2018-01-30 PROCEDURE — 73562 X-RAY EXAM OF KNEE 3: CPT | Mod: 26,50,, | Performed by: RADIOLOGY

## 2018-01-30 PROCEDURE — 99214 OFFICE O/P EST MOD 30 MIN: CPT | Mod: S$PBB,,, | Performed by: ORTHOPAEDIC SURGERY

## 2018-01-30 PROCEDURE — 99999 PR PBB SHADOW E&M-EST. PATIENT-LVL III: CPT | Mod: PBBFAC,,, | Performed by: ORTHOPAEDIC SURGERY

## 2018-01-30 PROCEDURE — 73562 X-RAY EXAM OF KNEE 3: CPT | Mod: TC,50,FY,PO

## 2018-01-30 PROCEDURE — 1159F MED LIST DOCD IN RCRD: CPT | Mod: ,,, | Performed by: ORTHOPAEDIC SURGERY

## 2018-01-30 PROCEDURE — 1125F AMNT PAIN NOTED PAIN PRSNT: CPT | Mod: ,,, | Performed by: ORTHOPAEDIC SURGERY

## 2018-01-30 PROCEDURE — 99213 OFFICE O/P EST LOW 20 MIN: CPT | Mod: PBBFAC,25,PO | Performed by: ORTHOPAEDIC SURGERY

## 2018-01-30 RX ORDER — NABUMETONE 500 MG/1
500 TABLET, FILM COATED ORAL DAILY
Qty: 30 TABLET | Refills: 2 | Status: SHIPPED | OUTPATIENT
Start: 2018-01-30 | End: 2018-02-22

## 2018-01-30 NOTE — PROGRESS NOTES
CC: This is a 66-year-old male that complains of knee pain.    HPI: The patient has a long history of bilateral knee pain.  He does have a history of arthritis as well.  He has taken anti-inflammatories in the past.  He denies any complications related to taking Mobic.  The patient states that the pain is a 7 out of 10.    PMH:    Past Medical History:   Diagnosis Date    BPH (benign prostatic hyperplasia)     Colon polyp     GERD (gastroesophageal reflux disease)     Hyperlipidemia     Hypertension     Iron deficiency anemia, unspecified     jejunal etc erosions 4/15       PSH:    Past Surgical History:   Procedure Laterality Date    KNEE SURGERY      right       Family Hx:  History reviewed. No pertinent family history.    Allergy:    Review of patient's allergies indicates:   Allergen Reactions    Nsaids (non-steroidal anti-inflammatory drug)      Jejunal erosions       Medication:    Current Outpatient Prescriptions:     diltiaZEM (TIAZAC) 240 MG CpSR, Take 1 capsule (240 mg total) by mouth once daily., Disp: 90 capsule, Rfl: 3    ergocalciferol (VITAMIN D2) 50,000 unit Cap, Take 1 capsule (50,000 Units total) by mouth every 7 days., Disp: 12 capsule, Rfl: 1    esomeprazole (NEXIUM) 40 MG capsule, Take 1 capsule (40 mg total) by mouth before breakfast., Disp: 90 capsule, Rfl: 3    ferrous gluconate (FERGON) 325 MG Tab, Take 1 tablet (325 mg total) by mouth daily with breakfast., Disp: 90 tablet, Rfl: 1    losartan (COZAAR) 50 MG tablet, Take 1 tablet (50 mg total) by mouth once daily., Disp: 90 tablet, Rfl: 3    meloxicam (MOBIC) 15 MG tablet, Take 1 tablet (15 mg total) by mouth once daily., Disp: 30 tablet, Rfl: 2    mometasone (NASONEX) 50 mcg/actuation nasal spray, 2 sprays by Nasal route once daily., Disp: 3 each, Rfl: 3    pravastatin (PRAVACHOL) 20 MG tablet, Take 1 tablet (20 mg total) by mouth once daily., Disp: 90 tablet, Rfl: 3    tamsulosin (FLOMAX) 0.4 mg Cp24, Take 1 capsule (0.4  "mg total) by mouth once daily., Disp: 90 capsule, Rfl: 3    nabumetone (RELAFEN) 500 MG tablet, Take 1 tablet (500 mg total) by mouth once daily. The patient states that he is not allergic to the medication., Disp: 30 tablet, Rfl: 2    tadalafil (CIALIS) 2.5 mg Tab, Take 1 tablet by mouth once daily., Disp: 90 each, Rfl: 3    Current Facility-Administered Medications:     hylan g-f 20 48 mg/6 mL injection 48 mg, 48 mg, Intra-articular, 1 time in Clinic/HOD, Troy Becerra Sr., MD    hylan g-f 20 48 mg/6 mL injection 48 mg, 48 mg, Intra-articular, 1 time in Clinic/HOD, Troy Becerra Sr., MD    hylan g-f 20 48 mg/6 mL injection 48 mg, 48 mg, Intra-articular, 1 time in Clinic/HOD, Troy Becerra Sr., MD    Social History:    Social History     Social History    Marital status:      Spouse name: N/A    Number of children: 3    Years of education: N/A     Occupational History    Not on file.     Social History Main Topics    Smoking status: Never Smoker    Smokeless tobacco: Never Used    Alcohol use No    Drug use: No    Sexual activity: No     Other Topics Concern    Not on file     Social History Narrative    [place] moved here 2014;         Vitals:   /82   Pulse 70   Resp 18   Ht 5' 9" (1.753 m)   Wt 85.3 kg (188 lb)   BMI 27.76 kg/m²      ROS:  GENERAL: No fever, chills, fatigability or weight loss.  SKIN: No rashes, itching or changes in color or texture of skin.  HEAD: No headaches or recent head trauma.  EYES: Visual acuity fine. No photophobia, ocular pain or diplopia.  EARS: Denies ear pain, discharge or vertigo.  NOSE: No loss of smell, no epistaxis or postnasal drip.  MOUTH & THROAT: No hoarseness or change in voice. No excessive gum bleeding.  NODES: Denies swollen glands.  CHEST: Denies BISHOP, cyanosis, wheezing, cough and sputum production.  CARDIOVASCULAR: Denies chest pain, PND, orthopnea or reduced exercise tolerance.  ABDOMEN: Appetite fine. No " weight loss. Denies diarrhea, abdominal pain, hematemesis or blood in stool.  URINARY: No flank pain, dysuria or hematuria.  PERIPHERAL VASCULAR: No claudication or cyanosis.  NEUROLOGIC: No history of seizures, paralysis, alteration of gait or coordination.  MUSCULOSKELETAL: See HPI    PE:  APPEARANCE: Well nourished, well developed, in no acute distress.   HEAD: Normocephalic, atraumatic.  EYES: PERRL. EOMI.   EARS: TM's intact. Light reflex normal. No retraction or perforation.   NOSE: Mucosa pink. Airway clear.  MOUTH & THROAT: No tonsillar enlargement. No pharyngeal erythema or exudate. No stridor.  NECK: Supple.   NODES: No cervical, axillary or inguinal lymph node enlargement.  CHEST: Lungs clear to auscultation.  CARDIOVASCULAR: Normal S1, S2. No rubs, murmurs or gallops.  ABDOMEN: Bowel sounds normal. Not distended. Soft. No tenderness or masses.  NEUROLOGIC: Cranial Nerves: II-XII grossly intact, also see MUSCULOSKELETAL  MUSCULOSKELETAL:        left Knee Exam-abnormal    Gait-abnormal  Muscle Appearance:abnormal  Grooming:normal  Spine Alignment-normal  Muscle Atrophy-Positive  Deformities-Negative  Tenderness-Positive  Paresthesias-Negative  Range of Motion         Ext-normal, 0 degrees         Flex-abnormal  Muscle Strength-abnormal  Sensation-normal  Reflexes-normal  Crepitus-Positive                                Swelling-Negative  Effusion- Positive                                Edema-Negative  Lachman-Negative                                Erythema-Negative  [facility]'s-Positive                              Apley Grind-Positive  Patellar Comp-Positive                         Alignment-normal/symmetric  Patellar Apprehension-Negative              Synovial fullness-Positive  Passive Patellar Tilt-normal  Patellar Tracking-normal   Patellar Glide-normal  Q-Angle at 90 degrees-normal  Patellar Grind-abnormal  [id number]-Mfigjwox  Fatigue-Negative                                     HS  Tightness-Negative  Tests on Exam, No ligamentous laxity  Neurovascular Status-normal+2 DP and PT artery pulses  Skin-normal         Right  Knee Exam-abnormal    Gait-abnormal  Muscle Appearance:abnormal  Grooming:normal  Spine Alignment-normal  Muscle Atrophy-Positive  Deformities-Negative  Tenderness-Positive  Paresthesias-Negative  Range of Motion         Ext-normal, 0 degrees         Flex-abnormal  Muscle Strength-abnormal  Sensation-normal  Reflexes-normal  Crepitus-Positive                                Swelling-Negative  Effusion- Positive                                Edema-Negative  Lachman-Negative                                Erythema-Negative  Rolanda's-Positive                              Apley Grind-Positive  Patellar Comp-Positive                         Alignment-normal/symmetric  Patellar Apprehension-Negative              Synovial fullness-Positive  Passive Patellar Tilt-normal  Patellar Tracking-normal   Patellar Glide-normal  Q-Angle at 90 degrees-normal  Patellar Grind-abnormal  [id number]  Fatigue-Negative                                     HS Tightness-Negative  Tests on Exam, No ligamentous laxity  Neurovascular Status-normal+2 DP and PT artery pulses  Skin-normal    Assessment:  The patient may benefit from a Synvisc injection in both knees.           Diagnosis:              1.  Bilateral knee arthritis               2.  Bilateral knee effusion               3.  Bilateral knee synovitis                   Diagnostic Studies  MRI-No  X-Ray-No  EMG/NCV-No  Arthrogram-No  Bone Scan-No  CT Scan-No  Doppler-No  ESR-No  CRP-No  CBC with Diff-No   Rheumatoid/Arthritis Panel-No      Plan:                                                 1. PT-yes                                                 2.OT-no                                          3.NSAID-yes                                        4. Narcotics-no                                     5. Wound care-No                                  6. Rest-no                                           7. Surgery-no                                         8. KADI Hose-no                                    9. Anticoagulation therapy-no               10. Elevation-no                                     11. Crutches-no                                    12. Walker-no             13. Cane yes                        14. Referral-no                                     15.Injection-yes, bilateral knee Synvisc injection, upon approval.                           16. Splint   /    Cast   /   Cast Shoe-Yes              17. RICE-none            18. Follow up-  3 weeks

## 2018-01-31 ENCOUNTER — PATIENT OUTREACH (OUTPATIENT)
Dept: ADMINISTRATIVE | Facility: HOSPITAL | Age: 68
End: 2018-01-31

## 2018-01-31 PROBLEM — M25.462 EFFUSION OF BOTH KNEE JOINTS: Status: ACTIVE | Noted: 2018-01-31

## 2018-01-31 PROBLEM — M67.369 TRANSIENT SYNOVITIS OF KNEE: Status: ACTIVE | Noted: 2018-01-31

## 2018-01-31 PROBLEM — M25.461 EFFUSION OF BOTH KNEE JOINTS: Status: ACTIVE | Noted: 2018-01-31

## 2018-01-31 NOTE — PATIENT INSTRUCTIONS
What is Arthritis?  Arthritis is a disease that affects the joints (the parts where bones meet and move). It can affect any joint in your body. There are many types of arthritis, including osteoarthritis and rheumatoid arthrtitis. If your symptoms are mild, medications may be enough to reduce pain and swelling. For more severe arthritis, surgery may be needed to improve the condition of the joint or replace the joint entirely.                  What causes arthritis?  Cartilage is a smooth substance that protects the ends of your bones and provides cushioning. When you have arthritis, this cartilage breaks down and can no longer protect your bones. The bones rub against each other, causing pain and swelling. Over time, bone spurs (small pieces of rough or splintered bone) may develop, and the joint's range of motion can become limited.  Symptoms  Some of the more common symptoms of arthritis include:  · Joint pain and stiffness. Pain and stiffness get worse with long periods of rest or using a joint too long or too hard.  · Joints that have lost normal shape and motion.  · Tender, inflamed joints. They may look red and feel warm.  · Grinding or popping noise with joint movement.   · Feeling tired all the time.  Reducing symptoms  Following a healthy lifestyle by losing weight and exercising can help reduce symptoms of osteoarthritis. Medicines can be very helpful for arthritis.     Date Last Reviewed: 9/10/2015  © 3517-9886 [employer]. [address] [place] [address], [place] [address]. All rights reserved. This information is not intended as a substitute for professional medical care. Always follow your healthcare professional's instructions.        What is Arthritis?  Arthritis is a disease that affects the joints (the parts where bones meet and move). It can affect any joint in your body. There are many types of arthritis, including osteoarthritis and rheumatoid arthrtitis. If your symptoms are mild,  medications may be enough to reduce pain and swelling. For more severe arthritis, surgery may be needed to improve the condition of the joint or replace the joint entirely.                  What causes arthritis?  Cartilage is a smooth substance that protects the ends of your bones and provides cushioning. When you have arthritis, this cartilage breaks down and can no longer protect your bones. The bones rub against each other, causing pain and swelling. Over time, bone spurs (small pieces of rough or splintered bone) may develop, and the joint's range of motion can become limited.  Symptoms  Some of the more common symptoms of arthritis include:  · Joint pain and stiffness. Pain and stiffness get worse with long periods of rest or using a joint too long or too hard.  · Joints that have lost normal shape and motion.  · Tender, inflamed joints. They may look red and feel warm.  · Grinding or popping noise with joint movement.   · Feeling tired all the time.  Reducing symptoms  Following a healthy lifestyle by losing weight and exercising can help reduce symptoms of osteoarthritis. Medicines can be very helpful for arthritis.     Date Last Reviewed: 9/10/2015  © 1255-1578 [employer]. [address] [place] [address], [place] [address]. All rights reserved. This information is not intended as a substitute for professional medical care. Always follow your healthcare professional's instructions.

## 2018-01-31 NOTE — PROGRESS NOTES
I have attempted without success to contact this patient to schedule an appointment for blood pressure recheck. Patient also due for zoster, pneumococcal, and influenza vaccinations. Patient not available, no answer.

## 2018-02-08 ENCOUNTER — OFFICE VISIT (OUTPATIENT)
Dept: ORTHOPEDICS | Facility: CLINIC | Age: 68
End: 2018-02-08
Payer: MEDICARE

## 2018-02-08 VITALS
HEIGHT: 69 IN | SYSTOLIC BLOOD PRESSURE: 145 MMHG | DIASTOLIC BLOOD PRESSURE: 91 MMHG | WEIGHT: 188 LBS | HEART RATE: 62 BPM | BODY MASS INDEX: 27.85 KG/M2

## 2018-02-08 DIAGNOSIS — M17.0 PRIMARY OSTEOARTHRITIS OF BOTH KNEES: Primary | ICD-10-CM

## 2018-02-08 PROCEDURE — 99213 OFFICE O/P EST LOW 20 MIN: CPT | Mod: PO | Performed by: ORTHOPAEDIC SURGERY

## 2018-02-08 PROCEDURE — 99999 PR PBB SHADOW E&M-EST. PATIENT-LVL III: CPT | Mod: PBBFAC,,, | Performed by: ORTHOPAEDIC SURGERY

## 2018-02-08 PROCEDURE — 1159F MED LIST DOCD IN RCRD: CPT | Mod: ,,, | Performed by: ORTHOPAEDIC SURGERY

## 2018-02-08 PROCEDURE — 99214 OFFICE O/P EST MOD 30 MIN: CPT | Mod: 25,S$PBB,, | Performed by: ORTHOPAEDIC SURGERY

## 2018-02-08 PROCEDURE — 1125F AMNT PAIN NOTED PAIN PRSNT: CPT | Mod: ,,, | Performed by: ORTHOPAEDIC SURGERY

## 2018-02-08 PROCEDURE — 20610 DRAIN/INJ JOINT/BURSA W/O US: CPT | Mod: 50,S$PBB,, | Performed by: ORTHOPAEDIC SURGERY

## 2018-02-08 PROCEDURE — 20610 DRAIN/INJ JOINT/BURSA W/O US: CPT | Mod: 50,PBBFAC,PO | Performed by: ORTHOPAEDIC SURGERY

## 2018-02-08 RX ADMIN — Medication 48 MG: at 06:02

## 2018-02-08 NOTE — PROGRESS NOTES
CC: This is a 66-year-old male that complains of bilateral knee pain.    HPI: The patient has a long history of bilateral knee pain.  He does have a history of arthritis as well.  He has taken anti-inflammatories in the past.  He denies any complications related to taking Mobic.  The patient states that the pain is a 7 out of 10.    PMH:    Past Medical History:   Diagnosis Date    BPH (benign prostatic hyperplasia)     Colon polyp     GERD (gastroesophageal reflux disease)     Hyperlipidemia     Hypertension     Iron deficiency anemia, unspecified     jejunal etc erosions 4/15       PSH:    Past Surgical History:   Procedure Laterality Date    KNEE SURGERY      right       Family Hx:  No family history on file.    Allergy:    Review of patient's allergies indicates:   Allergen Reactions    Nsaids (non-steroidal anti-inflammatory drug)      Jejunal erosions       Medication:    Current Outpatient Prescriptions:     diltiaZEM (TIAZAC) 240 MG CpSR, Take 1 capsule (240 mg total) by mouth once daily., Disp: 90 capsule, Rfl: 3    ergocalciferol (VITAMIN D2) 50,000 unit Cap, Take 1 capsule (50,000 Units total) by mouth every 7 days., Disp: 12 capsule, Rfl: 1    esomeprazole (NEXIUM) 40 MG capsule, Take 1 capsule (40 mg total) by mouth before breakfast., Disp: 90 capsule, Rfl: 3    ferrous gluconate (FERGON) 325 MG Tab, Take 1 tablet (325 mg total) by mouth daily with breakfast., Disp: 90 tablet, Rfl: 1    losartan (COZAAR) 50 MG tablet, Take 1 tablet (50 mg total) by mouth once daily., Disp: 90 tablet, Rfl: 3    meloxicam (MOBIC) 15 MG tablet, Take 1 tablet (15 mg total) by mouth once daily., Disp: 30 tablet, Rfl: 2    mometasone (NASONEX) 50 mcg/actuation nasal spray, 2 sprays by Nasal route once daily., Disp: 3 each, Rfl: 3    nabumetone (RELAFEN) 500 MG tablet, Take 1 tablet (500 mg total) by mouth once daily. The patient states that he is not allergic to the medication., Disp: 30 tablet, Rfl: 2     "pravastatin (PRAVACHOL) 20 MG tablet, Take 1 tablet (20 mg total) by mouth once daily., Disp: 90 tablet, Rfl: 3    tadalafil (CIALIS) 2.5 mg Tab, Take 1 tablet by mouth once daily., Disp: 90 each, Rfl: 3    tamsulosin (FLOMAX) 0.4 mg Cp24, Take 1 capsule (0.4 mg total) by mouth once daily., Disp: 90 capsule, Rfl: 3    Current Facility-Administered Medications:     hylan g-f 20 48 mg/6 mL injection 48 mg, 48 mg, Intra-articular, 1 time in Clinic/HOD, Troy Becerra Sr., MD    hylan g-f 20 48 mg/6 mL injection 48 mg, 48 mg, Intra-articular, 1 time in Clinic/HOD, Troy Becerra Sr., MD    hylan g-f 20 48 mg/6 mL injection 48 mg, 48 mg, Intra-articular, 1 time in Clinic/HOD, Troy Becerra Sr., MD    Social History:    Social History     Social History    Marital status:      Spouse name: N/A    Number of children: 3    Years of education: N/A     Occupational History    Not on file.     Social History Main Topics    Smoking status: Never Smoker    Smokeless tobacco: Never Used    Alcohol use No    Drug use: No    Sexual activity: No     Other Topics Concern    Not on file     Social History Narrative    [place] here [place];         Vitals:   BP (!) 145/91   Pulse 62   Ht 5' 9" (1.753 m)   Wt 85.3 kg (188 lb)   BMI 27.76 kg/m²      ROS:  GENERAL: No fever, chills, fatigability or weight loss.  SKIN: No rashes, itching or changes in color or texture of skin.  HEAD: No headaches or recent head trauma.  EYES: Visual acuity fine. No photophobia, ocular pain or diplopia.  EARS: Denies ear pain, discharge or vertigo.  NOSE: No loss of smell, no epistaxis or postnasal drip.  MOUTH & THROAT: No hoarseness or change in voice. No excessive gum bleeding.  NODES: Denies swollen glands.  CHEST: Denies BISHOP, cyanosis, wheezing, cough and sputum production.  CARDIOVASCULAR: Denies chest pain, PND, orthopnea or reduced exercise tolerance.  ABDOMEN: Appetite fine. No weight loss. Denies " diarrhea, abdominal pain, hematemesis or blood in stool.  URINARY: No flank pain, dysuria or hematuria.  PERIPHERAL VASCULAR: No claudication or cyanosis.  NEUROLOGIC: No history of seizures, paralysis, alteration of gait or coordination.  MUSCULOSKELETAL: See HPI    PE:  APPEARANCE: Well nourished, well developed, in no acute distress.   HEAD: Normocephalic, atraumatic.  EYES: PERRL. EOMI.   EARS: TM's intact. Light reflex normal. No retraction or perforation.   NOSE: Mucosa pink. Airway clear.  MOUTH & THROAT: No tonsillar enlargement. No pharyngeal erythema or exudate. No stridor.  NECK: Supple.   NODES: No cervical, axillary or inguinal lymph node enlargement.  CHEST: Lungs clear to auscultation.  CARDIOVASCULAR: Normal S1, S2. No rubs, murmurs or gallops.  ABDOMEN: Bowel sounds normal. Not distended. Soft. No tenderness or masses.  NEUROLOGIC: Cranial Nerves: II-XII grossly intact, also see MUSCULOSKELETAL  MUSCULOSKELETAL:        left Knee Exam-abnormal    Gait-abnormal  Muscle Appearance:abnormal  Grooming:normal  Spine Alignment-normal  Muscle Atrophy-Positive  Deformities-Negative  Tenderness-Positive  Paresthesias-Negative  Range of Motion         Ext-normal, 0 degrees         Flex-abnormal  Muscle Strength-abnormal  Sensation-normal  Reflexes-normal  Crepitus-Positive                                Swelling-Negative  Effusion- Positive                                Edema-Negative  Lachman-Negative                                Erythema-Negative  Rolanda's-Positive                              Apley Grind-Positive  Patellar Comp-Positive                         Alignment-normal/symmetric  Patellar Apprehension-Negative              Synovial fullness-Positive  Passive Patellar Tilt-normal  Patellar Tracking-normal   Patellar Glide-normal  Q-Angle at 90 degrees-normal  Patellar Grind-abnormal  [id number]  Fatigue-Negative                                     HS Tightness-Negative  Tests on Exam, No  ligamentous laxity  Neurovascular Status-normal+2 DP and PT artery pulses  Skin-normal         Right  Knee Exam-abnormal    Gait-abnormal  Muscle Appearance:abnormal  Grooming:normal  Spine Alignment-normal  Muscle Atrophy-Positive  Deformities-Negative  Tenderness-Positive  Paresthesias-Negative  Range of Motion         Ext-normal, 0 degrees         Flex-abnormal  Muscle Strength-abnormal  Sensation-normal  Reflexes-normal  Crepitus-Positive                                Swelling-Negative  Effusion- Positive                                Edema-Negative  Lachman-Negative                                Erythema-Negative  [facility]'s-Positive                              Apley Grind-Positive  Patellar Comp-Positive                         Alignment-normal/symmetric  Patellar Apprehension-Negative              Synovial fullness-Positive  Passive Patellar Tilt-normal  Patellar Tracking-normal   Patellar Glide-normal  Q-Angle at 90 degrees-normal  Patellar Grind-abnormal  R-Ryev-Yiuluasr  Fatigue-Negative                                     HS Tightness-Negative  Tests on Exam, No ligamentous laxity  Neurovascular Status-normal+2 DP and PT artery pulses  Skin-normal    Assessment:  The patient may benefit from a Synvisc injection in both knees.           Diagnosis:              1.  Bilateral knee arthritis               2.  Bilateral knee effusion               3.  Bilateral knee synovitis                   Diagnostic Studies  MRI-No  X-Ray-No  EMG/NCV-No  Arthrogram-No  Bone Scan-No  CT Scan-No  Doppler-No  ESR-No  CRP-No  CBC with Diff-No   Rheumatoid/Arthritis Panel-No      Plan:                                                 1. PT-yes                                                 2.OT-no                                          3.NSAID-yes                                        4. Narcotics-no                                     5. Wound care-No                                 6. Rest-no                                            7. Surgery-no                                         8. KADI Hose-no                                    9. Anticoagulation therapy-no               10. Elevation-no                                     11. Crutches-no                                    12. Walker-no             13. Cane yes                        14. Referral-no                                     15.Injection-yes, bilateral knee Synvisc injection                          16. Splint   /    Cast   /   Cast Shoe-Yes              17. RICE-none            18. Follow up-  3 weeks       Injection:  The patient was placed in the supine position with   the bilateral leg near   the end of the bed facing me.  The bilateral knee   was placed over a nonsterile pad.The Knee was   prepped, sterily, with Alcohol and Betadine.  A  Refrigerant  And coolant was used to locally anesthetize the skin   over the superior lateral aspect of the knee.  A one and   a half inch, 22 gauge needle attached to   A 10 cc synringe was placed into the lateral joint.   A negative pressure was placed on the needle to   ensure the aspiration was placed into the joint  And not into a blood vessel.  4 8 grams of Synvisc injected.   The patient tolerated the knee injection well.  A Bandage   was placed over the injection site.

## 2018-02-09 ENCOUNTER — PATIENT MESSAGE (OUTPATIENT)
Dept: ORTHOPEDICS | Facility: CLINIC | Age: 68
End: 2018-02-09

## 2018-02-09 NOTE — PATIENT INSTRUCTIONS
What is Arthritis?  Arthritis is a disease that affects the joints (the parts where bones meet and move). It can affect any joint in your body. There are many types of arthritis, including osteoarthritis and rheumatoid arthrtitis. If your symptoms are mild, medications may be enough to reduce pain and swelling. For more severe arthritis, surgery may be needed to improve the condition of the joint or replace the joint entirely.                  What causes arthritis?  Cartilage is a smooth substance that protects the ends of your bones and provides cushioning. When you have arthritis, this cartilage breaks down and can no longer protect your bones. The bones rub against each other, causing pain and swelling. Over time, bone spurs (small pieces of rough or splintered bone) may develop, and the joint's range of motion can become limited.  Symptoms  Some of the more common symptoms of arthritis include:  · Joint pain and stiffness. Pain and stiffness get worse with long periods of rest or using a joint too long or too hard.  · Joints that have lost normal shape and motion.  · Tender, inflamed joints. They may look red and feel warm.  · Grinding or popping noise with joint movement.   · Feeling tired all the time.  Reducing symptoms  Following a healthy lifestyle by losing weight and exercising can help reduce symptoms of osteoarthritis. Medicines can be very helpful for arthritis.     Date Last Reviewed: 9/10/2015  © 0999-2875 [employer]. [address] [place] [address], [place] [address]. All rights reserved. This information is not intended as a substitute for professional medical care. Always follow your healthcare professional's instructions.

## 2018-02-12 ENCOUNTER — LAB VISIT (OUTPATIENT)
Dept: LAB | Facility: HOSPITAL | Age: 68
End: 2018-02-12
Attending: FAMILY MEDICINE
Payer: MEDICARE

## 2018-02-12 DIAGNOSIS — E55.9 VITAMIN D DEFICIENCY: ICD-10-CM

## 2018-02-12 DIAGNOSIS — D64.9 ANEMIA, UNSPECIFIED TYPE: ICD-10-CM

## 2018-02-12 LAB
25(OH)D3+25(OH)D2 SERPL-MCNC: 21 NG/ML
BASOPHILS # BLD AUTO: 0.07 K/UL
BASOPHILS NFR BLD: 1.1 %
DIFFERENTIAL METHOD: ABNORMAL
EOSINOPHIL # BLD AUTO: 0.2 K/UL
EOSINOPHIL NFR BLD: 2.4 %
ERYTHROCYTE [DISTWIDTH] IN BLOOD BY AUTOMATED COUNT: 15.6 %
FERRITIN SERPL-MCNC: 19 NG/ML
HCT VFR BLD AUTO: 39.7 %
HGB BLD-MCNC: 12.6 G/DL
IMM GRANULOCYTES # BLD AUTO: 0.03 K/UL
IMM GRANULOCYTES NFR BLD AUTO: 0.5 %
LYMPHOCYTES # BLD AUTO: 2.3 K/UL
LYMPHOCYTES NFR BLD: 35.5 %
MCH RBC QN AUTO: 24.2 PG
MCHC RBC AUTO-ENTMCNC: 31.7 G/DL
MCV RBC AUTO: 76 FL
MONOCYTES # BLD AUTO: 0.6 K/UL
MONOCYTES NFR BLD: 9.6 %
NEUTROPHILS # BLD AUTO: 3.4 K/UL
NEUTROPHILS NFR BLD: 50.9 %
NRBC BLD-RTO: 0 /100 WBC
PLATELET # BLD AUTO: 221 K/UL
PMV BLD AUTO: 10.8 FL
RBC # BLD AUTO: 5.21 M/UL
WBC # BLD AUTO: 6.59 K/UL

## 2018-02-12 PROCEDURE — 82306 VITAMIN D 25 HYDROXY: CPT

## 2018-02-12 PROCEDURE — 85025 COMPLETE CBC W/AUTO DIFF WBC: CPT

## 2018-02-12 PROCEDURE — 36415 COLL VENOUS BLD VENIPUNCTURE: CPT | Mod: PO

## 2018-02-12 PROCEDURE — 82728 ASSAY OF FERRITIN: CPT

## 2018-02-14 DIAGNOSIS — M25.562 PAIN IN BOTH KNEES, UNSPECIFIED CHRONICITY: Primary | ICD-10-CM

## 2018-02-14 DIAGNOSIS — M25.561 PAIN IN BOTH KNEES, UNSPECIFIED CHRONICITY: Primary | ICD-10-CM

## 2018-02-21 ENCOUNTER — PATIENT MESSAGE (OUTPATIENT)
Dept: ORTHOPEDICS | Facility: CLINIC | Age: 68
End: 2018-02-21

## 2018-02-21 RX ORDER — MOMETASONE FUROATE 50 UG/1
SPRAY, METERED NASAL
Qty: 51 G | Refills: 3 | Status: SHIPPED | OUTPATIENT
Start: 2018-02-21 | End: 2018-03-20 | Stop reason: SDUPTHER

## 2018-02-22 RX ORDER — MELOXICAM 15 MG/1
15 TABLET ORAL DAILY
Qty: 90 TABLET | Refills: 2 | Status: SHIPPED | OUTPATIENT
Start: 2018-02-22 | End: 2018-05-17

## 2018-03-01 ENCOUNTER — PATIENT MESSAGE (OUTPATIENT)
Dept: ORTHOPEDICS | Facility: CLINIC | Age: 68
End: 2018-03-01

## 2018-03-05 ENCOUNTER — PATIENT MESSAGE (OUTPATIENT)
Dept: ORTHOPEDICS | Facility: CLINIC | Age: 68
End: 2018-03-05

## 2018-03-12 ENCOUNTER — TELEPHONE (OUTPATIENT)
Dept: RADIOLOGY | Facility: HOSPITAL | Age: 68
End: 2018-03-12

## 2018-03-13 ENCOUNTER — HOSPITAL ENCOUNTER (OUTPATIENT)
Dept: RADIOLOGY | Facility: HOSPITAL | Age: 68
Discharge: HOME | End: 2018-03-13
Attending: ORTHOPAEDIC SURGERY
Payer: MEDICARE

## 2018-03-13 DIAGNOSIS — M25.561 PAIN IN BOTH KNEES, UNSPECIFIED CHRONICITY: ICD-10-CM

## 2018-03-13 DIAGNOSIS — M25.562 PAIN IN BOTH KNEES, UNSPECIFIED CHRONICITY: ICD-10-CM

## 2018-03-13 PROCEDURE — 73721 MRI JNT OF LWR EXTRE W/O DYE: CPT | Mod: TC,PO,LT

## 2018-03-13 PROCEDURE — 73721 MRI JNT OF LWR EXTRE W/O DYE: CPT | Mod: 26,LT,, | Performed by: RADIOLOGY

## 2018-03-15 ENCOUNTER — CLINICAL SUPPORT (OUTPATIENT)
Dept: CARDIOLOGY | Facility: CLINIC | Age: 68
End: 2018-03-15
Payer: MEDICARE

## 2018-03-15 ENCOUNTER — OFFICE VISIT (OUTPATIENT)
Dept: ORTHOPEDICS | Facility: CLINIC | Age: 68
End: 2018-03-15
Payer: MEDICARE

## 2018-03-15 ENCOUNTER — HOSPITAL ENCOUNTER (OUTPATIENT)
Dept: RADIOLOGY | Facility: HOSPITAL | Age: 68
Discharge: HOME | End: 2018-03-15
Attending: ORTHOPAEDIC SURGERY
Payer: MEDICARE

## 2018-03-15 VITALS
HEART RATE: 50 BPM | BODY MASS INDEX: 27.85 KG/M2 | DIASTOLIC BLOOD PRESSURE: 81 MMHG | HEIGHT: 69 IN | SYSTOLIC BLOOD PRESSURE: 117 MMHG | WEIGHT: 188 LBS

## 2018-03-15 DIAGNOSIS — Z01.818 PRE-OP TESTING: ICD-10-CM

## 2018-03-15 DIAGNOSIS — M17.12 ARTHRITIS OF LEFT KNEE: ICD-10-CM

## 2018-03-15 DIAGNOSIS — S83.242D ACUTE MEDIAL MENISCUS TEAR OF LEFT KNEE, SUBSEQUENT ENCOUNTER: Primary | ICD-10-CM

## 2018-03-15 DIAGNOSIS — M23.301 MENISCUS, LATERAL, DERANGEMENT, LEFT: ICD-10-CM

## 2018-03-15 DIAGNOSIS — Z01.818 PRE-OP TESTING: Primary | ICD-10-CM

## 2018-03-15 PROBLEM — S83.242A ACUTE MEDIAL MENISCUS TEAR OF LEFT KNEE: Status: ACTIVE | Noted: 2018-03-15

## 2018-03-15 PROCEDURE — 71046 X-RAY EXAM CHEST 2 VIEWS: CPT | Mod: TC,FY,PO

## 2018-03-15 PROCEDURE — 99213 OFFICE O/P EST LOW 20 MIN: CPT | Mod: PBBFAC,25,PO | Performed by: ORTHOPAEDIC SURGERY

## 2018-03-15 PROCEDURE — 71046 X-RAY EXAM CHEST 2 VIEWS: CPT | Mod: 26,,, | Performed by: RADIOLOGY

## 2018-03-15 PROCEDURE — 99215 OFFICE O/P EST HI 40 MIN: CPT | Mod: S$PBB,,, | Performed by: ORTHOPAEDIC SURGERY

## 2018-03-15 PROCEDURE — 93005 ELECTROCARDIOGRAM TRACING: CPT | Mod: PBBFAC,PO | Performed by: INTERNAL MEDICINE

## 2018-03-15 PROCEDURE — 93010 ELECTROCARDIOGRAM REPORT: CPT | Mod: S$PBB,,, | Performed by: INTERNAL MEDICINE

## 2018-03-15 PROCEDURE — 99999 PR PBB SHADOW E&M-EST. PATIENT-LVL III: CPT | Mod: PBBFAC,,, | Performed by: ORTHOPAEDIC SURGERY

## 2018-03-16 RX ORDER — NABUMETONE 500 MG/1
TABLET, FILM COATED ORAL
Qty: 30 TABLET | Refills: 0 | Status: SHIPPED | OUTPATIENT
Start: 2018-03-16 | End: 2018-08-23

## 2018-03-16 NOTE — PATIENT INSTRUCTIONS
Knee Arthroscopy: Conditions Treated  Arthroscopy is used to find and treat many types of knee problems. These include tears in the meniscal cartilage, joint loose bodies, or anterior cruciate ligament (ACL) tears.     Damaged meniscal cartilage is removed.   Meniscal cartilage tears  There are several types of meniscal cartilage tears. The meniscal cartilage attaches on the tibia (shinbone) and acts as a shock absorber for the knee. Your surgery will depend on the type and extent of your injury. Your surgeon can remove the damaged tissue or fix the tear. Treatment should ease the pain and swelling. It can also help keep the joint from locking.     To replace damaged ACL tissue, a graft of strong, healthy tissue is attached.   ACL tears  A torn ACL (anterior cruciate ligament) can make the knee unstable. You may have pain and swelling, and your knee may give out. Your surgeon can repair the ACL by reconstructing it. To rebuild your ACL, damaged tissue may be replaced with a graft of healthy tissue from an area near your knee, or from a donor.     Date Last Reviewed: 8/31/2015 © 2000-2017 [employer]. [address] [place] [address], [place] [address]. All rights reserved. This information is not intended as a substitute for professional medical care. Always follow your healthcare professional's instructions.

## 2018-03-16 NOTE — PROGRESS NOTES
CC: This is a 66-year-old male that complains of bilateral knee pain.    HPI: The patient has a long history of bilateral knee pain.  He does have a history of arthritis as well.  He has taken anti-inflammatories in the past.  He denies any complications related to taking Mobic.  The patient states that the pain is a 7 out of 10.    PMH:    Past Medical History:   Diagnosis Date    BPH (benign prostatic hyperplasia)     Colon polyp     GERD (gastroesophageal reflux disease)     Hyperlipidemia     Hypertension     Iron deficiency anemia, unspecified     jejunal etc erosions 4/15       PSH:    Past Surgical History:   Procedure Laterality Date    KNEE SURGERY      right       Family Hx:  No family history on file.    Allergy:    Review of patient's allergies indicates:   Allergen Reactions    Nsaids (non-steroidal anti-inflammatory drug)      Jejunal erosions       Medication:    Current Outpatient Prescriptions:     diltiaZEM (TIAZAC) 240 MG CpSR, Take 1 capsule (240 mg total) by mouth once daily., Disp: 90 capsule, Rfl: 3    ergocalciferol (VITAMIN D2) 50,000 unit Cap, Take 1 capsule (50,000 Units total) by mouth every 7 days., Disp: 12 capsule, Rfl: 1    esomeprazole (NEXIUM) 40 MG capsule, Take 1 capsule (40 mg total) by mouth before breakfast., Disp: 90 capsule, Rfl: 3    ferrous gluconate (FERGON) 325 MG Tab, Take 1 tablet (325 mg total) by mouth daily with breakfast., Disp: 90 tablet, Rfl: 1    losartan (COZAAR) 50 MG tablet, Take 1 tablet (50 mg total) by mouth once daily., Disp: 90 tablet, Rfl: 3    meloxicam (MOBIC) 15 MG tablet, Take 1 tablet (15 mg total) by mouth once daily., Disp: 90 tablet, Rfl: 2    mometasone (NASONEX) 50 mcg/actuation nasal spray, 2 sprays by Nasal route once daily., Disp: 3 each, Rfl: 3    mometasone (NASONEX) 50 mcg/actuation nasal spray, USE 2 SPRAYS IN EACH       NOSTRIL ONCE DAILY, Disp: 51 g, Rfl: 3    pravastatin (PRAVACHOL) 20 MG tablet, Take 1 tablet (20 mg  "total) by mouth once daily., Disp: 90 tablet, Rfl: 3    tamsulosin (FLOMAX) 0.4 mg Cp24, Take 1 capsule (0.4 mg total) by mouth once daily., Disp: 90 capsule, Rfl: 3    tadalafil (CIALIS) 2.5 mg Tab, Take 1 tablet by mouth once daily., Disp: 90 each, Rfl: 3    Current Facility-Administered Medications:     hylan g-f 20 48 mg/6 mL injection 48 mg, 48 mg, Intra-articular, 1 time in Clinic/HOD, Troy Becerra Sr., MD    Social History:    Social History     Social History    Marital status:      Spouse name: N/A    Number of children: 3    Years of education: N/A     Occupational History    Not on file.     Social History Main Topics    Smoking status: Never Smoker    Smokeless tobacco: Never Used    Alcohol use No    Drug use: No    Sexual activity: No     Other Topics Concern    Not on file     Social History Narrative    [place] here [place];         Vitals:   /81   Pulse (!) 50   Ht 5' 9" (1.753 m)   Wt 85.3 kg (188 lb)   BMI 27.76 kg/m²      ROS:  GENERAL: No fever, chills, fatigability or weight loss.  SKIN: No rashes, itching or changes in color or texture of skin.  HEAD: No headaches or recent head trauma.  EYES: Visual acuity fine. No photophobia, ocular pain or diplopia.  EARS: Denies ear pain, discharge or vertigo.  NOSE: No loss of smell, no epistaxis or postnasal drip.  MOUTH & THROAT: No hoarseness or change in voice. No excessive gum bleeding.  NODES: Denies swollen glands.  CHEST: Denies BISHOP, cyanosis, wheezing, cough and sputum production.  CARDIOVASCULAR: Denies chest pain, PND, orthopnea or reduced exercise tolerance.  ABDOMEN: Appetite fine. No weight loss. Denies diarrhea, abdominal pain, hematemesis or blood in stool.  URINARY: No flank pain, dysuria or hematuria.  PERIPHERAL VASCULAR: No claudication or cyanosis.  NEUROLOGIC: No history of seizures, paralysis, alteration of gait or coordination.  MUSCULOSKELETAL: See HPI    PE:  APPEARANCE: " Well nourished, well developed, in no acute distress.   HEAD: Normocephalic, atraumatic.  EYES: PERRL. EOMI.   EARS: TM's intact. Light reflex normal. No retraction or perforation.   NOSE: Mucosa pink. Airway clear.  MOUTH & THROAT: No tonsillar enlargement. No pharyngeal erythema or exudate. No stridor.  NECK: Supple.   NODES: No cervical, axillary or inguinal lymph node enlargement.  CHEST: Lungs clear to auscultation.  CARDIOVASCULAR: Normal S1, S2. No rubs, murmurs or gallops.  ABDOMEN: Bowel sounds normal. Not distended. Soft. No tenderness or masses.  NEUROLOGIC: Cranial Nerves: II-XII grossly intact, also see MUSCULOSKELETAL  MUSCULOSKELETAL:        left Knee Exam-abnormal    Gait-abnormal  Muscle Appearance:abnormal  Grooming:normal  Spine Alignment-normal  Muscle Atrophy-Positive  Deformities-Negative  Tenderness-Positive  Paresthesias-Negative  Range of Motion         Ext-normal, 0 degrees         Flex-abnormal  Muscle Strength-abnormal  Sensation-normal  Reflexes-normal  Crepitus-Positive                                Swelling-Negative  Effusion- Positive                                Edema-Negative  Lachman-Negative                                Erythema-Negative  [facility]'s-Positive                              Apley Grind-Positive  Patellar Comp-Positive                         Alignment-normal/symmetric  Patellar Apprehension-Negative              Synovial fullness-Positive  Passive Patellar Tilt-normal  Patellar Tracking-normal   Patellar Glide-normal  Q-Angle at 90 degrees-normal  Patellar Grind-abnormal  [id number]-[id number]-Nroawddt  Fatigue-Negative                                     HS Tightness-Negative  Tests on Exam, No ligamentous laxity  Neurovascular Status-normal+2 DP and PT artery pulses  Skin-normal         Right  Knee Exam-abnormal    Gait-abnormal  Muscle Appearance:abnormal  Grooming:normal  Spine Alignment-normal  Muscle  Atrophy-Positive  Deformities-Negative  Tenderness-Positive  Paresthesias-Negative  Range of Motion         Ext-normal, 0 degrees         Flex-abnormal  Muscle Strength-abnormal  Sensation-normal  Reflexes-normal  Crepitus-Positive                                Swelling-Negative  Effusion- Positive                                Edema-Negative  Lachman-Negative                                Erythema-Negative  [facility]'s-Positive                              Apley Grind-Positive  Patellar Comp-Positive                         Alignment-normal/symmetric  Patellar Apprehension-Negative              Synovial fullness-Positive  Passive Patellar Tilt-normal  Patellar Tracking-normal   Patellar Glide-normal  Q-Angle at 90 degrees-normal  Patellar Grind-abnormal  [id number]-Hlpa-[id number]  Fatigue-Negative                                     HS Tightness-Negative  Tests on Exam, No ligamentous laxity  Neurovascular Status-normal+2 DP and PT artery pulses  Skin-normal    Assessment:  The patient understands that he does have arthritis as well as a medial meniscus tear.  He is aware that a resection or repair of the medial meniscus with an arthroscope will not cure the arthritis.  He will continue to have symptoms related to his arthritis following an arthroscope.  The arthroscope may be of help in alleviating the pain associated with the meniscal tear.  The patient does report mechanical symptoms associated with the pain localized to the medial side of his knee and the MRI shows a meniscus tear over the medial aspect of the knee.           Diagnosis:              1.  Bilateral knee arthritis               2.  Bilateral knee effusion               3.  Bilateral knee synovitis               4.  Left knee meniscal tear    Diagnostic Studies  MRI-No  X-Ray-No  EMG/NCV-No  Arthrogram-No  Bone Scan-No  CT Scan-No  Doppler-No  ESR-No  CRP-No  CBC with Diff-No   Rheumatoid/Arthritis Panel-No      Plan:                                                  1. PT-yes                                                 2.OT-no                                          3.NSAID-yes                                        4. Narcotics-no                                     5. Wound care-No                                 6. Rest-no                                           7. Surgery-yes,  left knee arthroscope                                       8. KADI Hose-no                                    9. Anticoagulation therapy-no               10. Elevation-no                                     11. Crutches-no                                    12. Walker-no             13. Cane yes                        14. Referral-no                                     15.Injection-no                      16. Splint   /    Cast   /   Cast Shoe-Yes              17. RICE-none            18. Follow up-  3 weeks

## 2018-03-20 ENCOUNTER — OFFICE VISIT (OUTPATIENT)
Dept: INTERNAL MEDICINE | Facility: CLINIC | Age: 68
End: 2018-03-20
Payer: MEDICARE

## 2018-03-20 ENCOUNTER — ANESTHESIA EVENT (OUTPATIENT)
Dept: SURGERY | Facility: HOSPITAL | Age: 68
End: 2018-03-20
Payer: MEDICARE

## 2018-03-20 VITALS
OXYGEN SATURATION: 97 % | RESPIRATION RATE: 16 BRPM | DIASTOLIC BLOOD PRESSURE: 68 MMHG | BODY MASS INDEX: 26.07 KG/M2 | HEART RATE: 62 BPM | WEIGHT: 182.13 LBS | TEMPERATURE: 97 F | SYSTOLIC BLOOD PRESSURE: 100 MMHG | HEIGHT: 70 IN

## 2018-03-20 DIAGNOSIS — K21.9 GASTROESOPHAGEAL REFLUX DISEASE WITHOUT ESOPHAGITIS: ICD-10-CM

## 2018-03-20 DIAGNOSIS — Z01.818 PRE-OPERATIVE CLEARANCE: Primary | ICD-10-CM

## 2018-03-20 DIAGNOSIS — S83.242D ACUTE MEDIAL MENISCUS TEAR OF LEFT KNEE, SUBSEQUENT ENCOUNTER: ICD-10-CM

## 2018-03-20 DIAGNOSIS — M17.0 PRIMARY OSTEOARTHRITIS OF BOTH KNEES: Chronic | ICD-10-CM

## 2018-03-20 DIAGNOSIS — Z86.010 HISTORY OF COLON POLYPS: ICD-10-CM

## 2018-03-20 DIAGNOSIS — I10 ESSENTIAL HYPERTENSION: ICD-10-CM

## 2018-03-20 DIAGNOSIS — E78.1 HYPERTRIGLYCERIDEMIA, ESSENTIAL: ICD-10-CM

## 2018-03-20 DIAGNOSIS — N40.0 BENIGN PROSTATIC HYPERPLASIA, UNSPECIFIED WHETHER LOWER URINARY TRACT SYMPTOMS PRESENT: ICD-10-CM

## 2018-03-20 DIAGNOSIS — E66.3 OVERWEIGHT: ICD-10-CM

## 2018-03-20 DIAGNOSIS — D50.8 IRON DEFICIENCY ANEMIA SECONDARY TO INADEQUATE DIETARY IRON INTAKE: Chronic | ICD-10-CM

## 2018-03-20 PROBLEM — B36.0 TINEA VERSICOLOR: Status: ACTIVE | Noted: 2018-03-20

## 2018-03-20 PROCEDURE — 99215 OFFICE O/P EST HI 40 MIN: CPT | Mod: S$PBB,,, | Performed by: PHYSICIAN ASSISTANT

## 2018-03-20 PROCEDURE — 99214 OFFICE O/P EST MOD 30 MIN: CPT | Mod: PBBFAC,PO | Performed by: PHYSICIAN ASSISTANT

## 2018-03-20 PROCEDURE — 99999 PR PBB SHADOW E&M-EST. PATIENT-LVL IV: CPT | Mod: PBBFAC,,, | Performed by: PHYSICIAN ASSISTANT

## 2018-03-20 NOTE — PRE-PROCEDURE INSTRUCTIONS
Pre op instructions reviewed with patient per phone:    To confirm, Your surgeon has instructed you:  Surgery is scheduled 3/21/18at 0700.      Please report to [facility] 1st floor main lobby by 0530.   Pre admit office to call this afternoon only if arrival time for surgery changes      INSTRUCTIONS IMPORTANT!!!  ¨ Do not eat, drink, or smoke after 12 midnight-including water. OK to brush teeth, no gum, candy or mints!    ¨ Take only these medicines with a small swallow of water-morning of surgery.  Nexium, Cozaar    ____  Do not wear makeup, including mascara.  ____  No powder, lotions or creams to surgical area.  ____  Please remove all jewelry, including piercings and leave at home.  ____  No money or valuables needed. Please leave at home.  ____  Please bring identification and insurance information to hospital.  ____  If going home the same day, arrange for a ride home. You will not be able to   drive if Anesthesia was used.  ____  Children, under 12 years old, must remain in the waiting room with an adult.  They are not allowed in patient areas.  ____  Wear loose fitting clothing. Allow for dressings, bandages.  ____  Stop Aspirin, Ibuprofen, Motrin and Aleve at least 5-7 days before surgery, unless otherwise instructed by your doctor, or the nurse.   You MAY use Tylenol/acetaminophen until day of surgery.  ____  If you take diabetic medication, do not take am of surgery unless instructed by   Doctor.  ____ Stop taking any Fish Oil supplement or any Vitamins that contain Vitamin E at least 5 days prior to surgery.          Bathing Instructions-- The night before surgery and the morning prior to coming to the hospital:   -Do not shave the surgical area.   -Shower and wash your hair and body as usual with anti-bacterial  soap and shampoo.   -Rinse your hair and body completely.   -Use one packet of hibiclens to wash the surgical site (using your hand) gently for 5 minutes.  Do not  scrub you skin too hard.   -Do not use hibiclens on your head, face, or genitals.   -Do not wash with anti-bacterial soap after you use the hibiclens.   -Rinse your body thoroughly.   -Dry with clean, soft towel.  Do not use lotion, cream, deodorant, or powders on   the surgical site.    Use antibacterial soap in place of hibiclens if your surgery is on the head, face or genitals.         Surgical Site Infection    Prevention of surgical site infections:     -Keep incisions clean and dry.   -Do not soak/submerge incisions in water until completely healed.   -Do not apply lotions, powders, creams, or deodorants to site.   -Always make sure hands are cleaned with antibacterial soap/ alcohol-based   prior to touching the surgical site.  (This includes doctors, nurses, staff, and yourself.)    Signs and symptoms:   -Redness and pain around the area where you had surgery   -Drainage of cloudy fluid from your surgical wound   -Fever over 100.4  I have read or had read and explained to me, and understand the above information.

## 2018-03-20 NOTE — PROGRESS NOTES
Subjective:       Patient ID: Eduardo Krishnamurthy is a 67 y.o. [place]/ male.    Chief Complaint: Pre-op Exam    HPI         He comes in by himself today for the above need.  He is going to have left knee arthroscopy done  by Dr. Troy Becerra M.D.  He is here to get medical pre-operative clearance.  He has not had any acute illnesses which includes the RESPIRATORY, PULMONARY, GI, , and INTEGUMENT system.    PAST MEDICAL HISTORY    CHILDHOOD: Negative.  MEDICAL: Overweight.  HTN.  Hypertriglyceridemia.  BPH.  Benign Colon polyp.  Vitamin D deficiency.  Hypochromic microcytic anemia chronic.  Osteoarthritis of both knees.  He had colonoscopy done in .  SURGICAL: Right knee arthroscopy meniscus tear age 55.  FAMILY: Both parents are .                Mother  age 67 probably do to broken heart.                Father  age 66 due to either a CVA or an AMI.                Has 1 brother living and well.                Had 1 sister  age 78 due to his CVA.                3 children living and well.  Age 40, 37, and 30.                         He has 2 grandchildren living and well.                 0 great-grandchildren.  SOCIAL HABITS:  ×44 years and lives with his wife.                Works as a  for [employer] and [employer].                Never used tobacco products.               Never used EtOH products.                   Drinks tea for caffeine intake.                Education = 12+4.  Never finished his doctorate.                Born and raised in [place].             Has been to [place] for foreign travel.               Has never acquired any tropical diseases.            Review of Systems   Constitutional: Negative.  Negative for appetite change, chills, diaphoresis, fatigue, fever and unexpected weight change.   HENT: Negative.  Negative for congestion, dental problem, ear pain, hearing loss, mouth sores, nosebleeds, postnasal drip, rhinorrhea, sinus  pressure, sneezing, sore throat, tinnitus and voice change.    Eyes: Negative.  Negative for photophobia and visual disturbance.   Respiratory: Negative.  Negative for apnea, cough, chest tightness, shortness of breath and wheezing.    Cardiovascular: Negative.  Negative for chest pain, palpitations and leg swelling.   Gastrointestinal: Negative.  Negative for abdominal pain, blood in stool, constipation, diarrhea, nausea and vomiting.   Endocrine: Negative.  Negative for cold intolerance, heat intolerance and polyuria.   Genitourinary: Negative.  Negative for difficulty urinating, dysuria, enuresis, frequency, hematuria, scrotal swelling, testicular pain and urgency.   Musculoskeletal: Positive for arthralgias. Negative for back pain and myalgias.        He has pain in both knees and has to utilize or walking cane for ambulation.   Skin: Negative.  Negative for pallor and rash.   Allergic/Immunologic: Negative.  Negative for environmental allergies, food allergies and immunocompromised state.   Neurological: Negative.  Negative for dizziness, tremors, seizures, syncope, weakness, light-headedness, numbness and headaches.   Hematological: Negative.  Negative for adenopathy. Does not bruise/bleed easily.   Psychiatric/Behavioral: Negative.  Negative for behavioral problems, confusion, decreased concentration, dysphoric mood, sleep disturbance and suicidal ideas. The patient is not nervous/anxious.        Otherwise negative.  Objective:      Physical Exam   Constitutional: He is oriented to person, place, and time. He appears well-developed and well-nourished.   His ambulation is fairly normal but with a noticeable limp concerning his left knee.  He is using a single footed cane today.  His posture is good and normal.   HENT:   Head: Normocephalic and atraumatic.   Right Ear: External ear normal.   Left Ear: External ear normal.   Nose: Nose normal.   Mouth/Throat: Oropharynx is clear and moist. No oropharyngeal  exudate.   His hearing is good and he does not use any hearing aids.  His teeth are almost all present and in good repair.   Eyes: Conjunctivae and EOM are normal. Pupils are equal, round, and reactive to light. No scleral icterus.   He wears glasses but no contacts.   Neck: Normal range of motion. Neck supple. No JVD present. No tracheal deviation present. No thyromegaly present.   Cardiovascular: Normal rate, regular rhythm, normal heart sounds and intact distal pulses.  Exam reveals no gallop and no friction rub.    No murmur heard.  Pulmonary/Chest: Effort normal and breath sounds normal. No respiratory distress. He has no wheezes. He has no rales. He exhibits no tenderness.   Abdominal: Soft. Bowel sounds are normal. He exhibits no mass. There is no tenderness. There is no rebound and no guarding.   Genitourinary:   Genitourinary Comments: This portion of the examination was not accomplished today.   Musculoskeletal: Normal range of motion. He exhibits no edema or tenderness.   Lymphadenopathy:     He has no cervical adenopathy.   Neurological: He is alert and oriented to person, place, and time. He has normal reflexes. He displays normal reflexes. No cranial nerve deficit. Coordination normal.   Skin: Skin is warm and dry. No rash noted. He is not diaphoretic. No erythema.   He has discoloration of tinea versicolor all across his back and his upper anterior chest.  He has a little bit of eczema on his back near his belt line.   Psychiatric: He has a normal mood and affect. His behavior is normal. Judgment and thought content normal.   Nursing note and vitals reviewed.      He had pre--operative ancillary studies done by his surgeon prior to this visit.  These included CBC, Chem-20, Chest PA and lateral, and EKG.  All were normal except for a chronic microcytic hyperchromic anemia.  It's very stable.    Assessment:       1. Pre-operative clearance    2. Overweight    3. Acute medial meniscus tear of left knee,  subsequent encounter    4. Primary osteoarthritis of both knees    5. Essential hypertension    6. Hypertriglyceridemia, essential    7. Benign prostatic hyperplasia, unspecified whether lower urinary tract symptoms present    8. Iron deficiency anemia secondary to inadequate dietary iron intake    9. Gastroesophageal reflux disease without esophagitis    10. History of colon polyps        Plan:     1.  The patient is very low risk for complication from local or general anesthetic and is cleared for surgery at this time.

## 2018-03-20 NOTE — ANESTHESIA PREPROCEDURE EVALUATION
03/20/2018  Eduardo Krishnamurthy is a 67 y.o., male.    Anesthesia Evaluation    I have reviewed the Patient Summary Reports.    I have reviewed the Nursing Notes.   I have reviewed the Medications.     Review of Systems  Anesthesia Hx:  No problems with previous Anesthesia  History of prior surgery of interest to airway management or planning: Denies Family Hx of Anesthesia complications.   Denies Personal Hx of Anesthesia complications.   Social:  Non-Smoker    Hematology/Oncology:         -- Anemia:   Cardiovascular:   Hypertension hyperlipidemia ECG has been reviewed.    Renal/:   BPH    Hepatic/GI:   GERD    Musculoskeletal:   Arthritis  Left knee injury       Physical Exam  General:  Well nourished    Airway/Jaw/Neck:  Airway Findings: Mouth Opening: Normal Tongue: Normal  General Airway Assessment: Adult  Mallampati: II          Chest/Lungs:  Chest/Lungs Findings: Normal Respiratory Rate     Heart/Vascular:  Heart Findings: Rate: Normal             Anesthesia Plan  Type of Anesthesia, risks & benefits discussed:  Anesthesia Type:  general  Patient's Preference:   Intra-op Monitoring Plan:   Intra-op Monitoring Plan Comments:   Post Op Pain Control Plan: multimodal analgesia and peripheral nerve block  Post Op Pain Control Plan Comments:   Induction:   IV  Beta Blocker:  Patient is not currently on a Beta-Blocker (No further documentation required).       Informed Consent: Patient understands risks and agrees with Anesthesia plan.  Questions answered. Anesthesia consent signed with patient.  ASA Score: 2     Day of Surgery Review of History & Physical:    H&P update referred to the surgeon.         Ready For Surgery From Anesthesia Perspective.

## 2018-03-21 ENCOUNTER — SURGERY (OUTPATIENT)
Age: 68
End: 2018-03-21

## 2018-03-21 ENCOUNTER — ANESTHESIA (OUTPATIENT)
Dept: SURGERY | Facility: HOSPITAL | Age: 68
End: 2018-03-21
Payer: MEDICARE

## 2018-03-21 ENCOUNTER — HOSPITAL ENCOUNTER (OUTPATIENT)
Facility: HOSPITAL | Age: 68
Discharge: HOME | End: 2018-03-21
Attending: ORTHOPAEDIC SURGERY | Admitting: ORTHOPAEDIC SURGERY
Payer: MEDICARE

## 2018-03-21 ENCOUNTER — NURSE TRIAGE (OUTPATIENT)
Dept: ADMINISTRATIVE | Facility: CLINIC | Age: 68
End: 2018-03-21

## 2018-03-21 DIAGNOSIS — M67.362 TRANSIENT SYNOVITIS OF LEFT KNEE: ICD-10-CM

## 2018-03-21 DIAGNOSIS — M25.562 LEFT KNEE PAIN: ICD-10-CM

## 2018-03-21 DIAGNOSIS — M65.9 SYNOVITIS OF KNEE: Primary | ICD-10-CM

## 2018-03-21 DIAGNOSIS — Z98.890 POST-OPERATIVE STATE: Primary | ICD-10-CM

## 2018-03-21 PROCEDURE — 25000003 PHARM REV CODE 250: Performed by: ORTHOPAEDIC SURGERY

## 2018-03-21 PROCEDURE — 63600175 PHARM REV CODE 636 W HCPCS: Performed by: NURSE ANESTHETIST, CERTIFIED REGISTERED

## 2018-03-21 PROCEDURE — 37000008 HC ANESTHESIA 1ST 15 MINUTES: Performed by: ORTHOPAEDIC SURGERY

## 2018-03-21 PROCEDURE — 63600175 PHARM REV CODE 636 W HCPCS: Performed by: ANESTHESIOLOGY

## 2018-03-21 PROCEDURE — 36000711: Performed by: ORTHOPAEDIC SURGERY

## 2018-03-21 PROCEDURE — 25000003 PHARM REV CODE 250: Performed by: NURSE ANESTHETIST, CERTIFIED REGISTERED

## 2018-03-21 PROCEDURE — 29876 ARTHRS KNEE SURG SYNVCT MAJ: CPT | Mod: LT,,, | Performed by: ORTHOPAEDIC SURGERY

## 2018-03-21 PROCEDURE — 71000033 HC RECOVERY, INTIAL HOUR: Performed by: ORTHOPAEDIC SURGERY

## 2018-03-21 PROCEDURE — 71000039 HC RECOVERY, EACH ADD'L HOUR: Performed by: ORTHOPAEDIC SURGERY

## 2018-03-21 PROCEDURE — 25000003 PHARM REV CODE 250: Performed by: ANESTHESIOLOGY

## 2018-03-21 PROCEDURE — 36000710: Performed by: ORTHOPAEDIC SURGERY

## 2018-03-21 PROCEDURE — 63600175 PHARM REV CODE 636 W HCPCS: Performed by: ORTHOPAEDIC SURGERY

## 2018-03-21 PROCEDURE — 29881 ARTHRS KNE SRG MNISECTMY M/L: CPT | Mod: 51,LT,, | Performed by: ORTHOPAEDIC SURGERY

## 2018-03-21 PROCEDURE — 27201423 OPTIME MED/SURG SUP & DEVICES STERILE SUPPLY: Performed by: ORTHOPAEDIC SURGERY

## 2018-03-21 PROCEDURE — 37000009 HC ANESTHESIA EA ADD 15 MINS: Performed by: ORTHOPAEDIC SURGERY

## 2018-03-21 PROCEDURE — 71000015 HC POSTOP RECOV 1ST HR: Performed by: ORTHOPAEDIC SURGERY

## 2018-03-21 PROCEDURE — 29881 ARTHRS KNE SRG MNISECTMY M/L: CPT | Mod: AS,51,LT, | Performed by: PHYSICIAN ASSISTANT

## 2018-03-21 PROCEDURE — 29876 ARTHRS KNEE SURG SYNVCT MAJ: CPT | Mod: AS,LT,, | Performed by: PHYSICIAN ASSISTANT

## 2018-03-21 RX ORDER — HYDROCODONE BITARTRATE AND ACETAMINOPHEN 10; 325 MG/1; MG/1
1 TABLET ORAL EVERY 4 HOURS PRN
Qty: 60 TABLET | Refills: 0 | Status: SHIPPED | OUTPATIENT
Start: 2018-03-21 | End: 2018-03-23

## 2018-03-21 RX ORDER — CHLORHEXIDINE GLUCONATE ORAL RINSE 1.2 MG/ML
10 SOLUTION DENTAL 2 TIMES DAILY
Status: DISCONTINUED | OUTPATIENT
Start: 2018-03-21 | End: 2018-03-21 | Stop reason: HOSPADM

## 2018-03-21 RX ORDER — FENTANYL CITRATE 50 UG/ML
INJECTION, SOLUTION INTRAMUSCULAR; INTRAVENOUS
Status: DISCONTINUED | OUTPATIENT
Start: 2018-03-21 | End: 2018-03-21

## 2018-03-21 RX ORDER — SODIUM CHLORIDE 9 MG/ML
INJECTION, SOLUTION INTRAVENOUS CONTINUOUS
Status: DISCONTINUED | OUTPATIENT
Start: 2018-03-21 | End: 2018-03-21 | Stop reason: SDUPTHER

## 2018-03-21 RX ORDER — LIDOCAINE HYDROCHLORIDE 10 MG/ML
INJECTION INFILTRATION; PERINEURAL
Status: DISCONTINUED | OUTPATIENT
Start: 2018-03-21 | End: 2018-03-21

## 2018-03-21 RX ORDER — NEOSTIGMINE METHYLSULFATE 1 MG/ML
INJECTION, SOLUTION INTRAVENOUS
Status: DISCONTINUED | OUTPATIENT
Start: 2018-03-21 | End: 2018-03-21

## 2018-03-21 RX ORDER — CEFAZOLIN SODIUM 2 G/50ML
2 SOLUTION INTRAVENOUS
Status: DISCONTINUED | OUTPATIENT
Start: 2018-03-21 | End: 2018-03-21 | Stop reason: HOSPADM

## 2018-03-21 RX ORDER — MIDAZOLAM HYDROCHLORIDE 1 MG/ML
INJECTION, SOLUTION INTRAMUSCULAR; INTRAVENOUS
Status: DISCONTINUED | OUTPATIENT
Start: 2018-03-21 | End: 2018-03-21

## 2018-03-21 RX ORDER — BUPIVACAINE HYDROCHLORIDE 2.5 MG/ML
INJECTION, SOLUTION EPIDURAL; INFILTRATION; INTRACAUDAL
Status: DISCONTINUED | OUTPATIENT
Start: 2018-03-21 | End: 2018-03-21 | Stop reason: HOSPADM

## 2018-03-21 RX ORDER — ROCURONIUM BROMIDE 10 MG/ML
INJECTION, SOLUTION INTRAVENOUS
Status: DISCONTINUED | OUTPATIENT
Start: 2018-03-21 | End: 2018-03-21

## 2018-03-21 RX ORDER — FENTANYL CITRATE 50 UG/ML
25 INJECTION, SOLUTION INTRAMUSCULAR; INTRAVENOUS EVERY 5 MIN PRN
Status: DISCONTINUED | OUTPATIENT
Start: 2018-03-21 | End: 2018-03-21 | Stop reason: HOSPADM

## 2018-03-21 RX ORDER — CHLORHEXIDINE GLUCONATE ORAL RINSE 1.2 MG/ML
10 SOLUTION DENTAL
Status: DISCONTINUED | OUTPATIENT
Start: 2018-03-21 | End: 2018-03-21 | Stop reason: SDUPTHER

## 2018-03-21 RX ORDER — SODIUM CHLORIDE 9 MG/ML
INJECTION, SOLUTION INTRAVENOUS CONTINUOUS
Status: DISCONTINUED | OUTPATIENT
Start: 2018-03-21 | End: 2018-03-21 | Stop reason: HOSPADM

## 2018-03-21 RX ORDER — ONDANSETRON 2 MG/ML
INJECTION INTRAMUSCULAR; INTRAVENOUS
Status: DISCONTINUED | OUTPATIENT
Start: 2018-03-21 | End: 2018-03-21

## 2018-03-21 RX ORDER — LIDOCAINE HYDROCHLORIDE 10 MG/ML
INJECTION, SOLUTION EPIDURAL; INFILTRATION; INTRACAUDAL; PERINEURAL
Status: DISCONTINUED | OUTPATIENT
Start: 2018-03-21 | End: 2018-03-21 | Stop reason: HOSPADM

## 2018-03-21 RX ORDER — OXYCODONE HYDROCHLORIDE 5 MG/1
5 TABLET ORAL ONCE AS NEEDED
Status: DISCONTINUED | OUTPATIENT
Start: 2018-03-21 | End: 2018-03-21 | Stop reason: HOSPADM

## 2018-03-21 RX ORDER — ACETAMINOPHEN 10 MG/ML
INJECTION, SOLUTION INTRAVENOUS
Status: DISCONTINUED | OUTPATIENT
Start: 2018-03-21 | End: 2018-03-21

## 2018-03-21 RX ORDER — ACETAMINOPHEN 10 MG/ML
1000 INJECTION, SOLUTION INTRAVENOUS ONCE
Status: DISCONTINUED | OUTPATIENT
Start: 2018-03-21 | End: 2018-03-21 | Stop reason: SDUPTHER

## 2018-03-21 RX ORDER — ONDANSETRON 2 MG/ML
4 INJECTION INTRAMUSCULAR; INTRAVENOUS DAILY PRN
Status: DISCONTINUED | OUTPATIENT
Start: 2018-03-21 | End: 2018-03-21 | Stop reason: HOSPADM

## 2018-03-21 RX ORDER — PROPOFOL 10 MG/ML
VIAL (ML) INTRAVENOUS
Status: DISCONTINUED | OUTPATIENT
Start: 2018-03-21 | End: 2018-03-21

## 2018-03-21 RX ORDER — SODIUM CHLORIDE, SODIUM LACTATE, POTASSIUM CHLORIDE, CALCIUM CHLORIDE 600; 310; 30; 20 MG/100ML; MG/100ML; MG/100ML; MG/100ML
INJECTION, SOLUTION INTRAVENOUS CONTINUOUS
Status: DISCONTINUED | OUTPATIENT
Start: 2018-03-21 | End: 2018-03-21 | Stop reason: HOSPADM

## 2018-03-21 RX ORDER — MEPERIDINE HYDROCHLORIDE 50 MG/ML
12.5 INJECTION INTRAMUSCULAR; INTRAVENOUS; SUBCUTANEOUS ONCE AS NEEDED
Status: COMPLETED | OUTPATIENT
Start: 2018-03-21 | End: 2018-03-21

## 2018-03-21 RX ORDER — DEXAMETHASONE SODIUM PHOSPHATE 4 MG/ML
INJECTION, SOLUTION INTRA-ARTICULAR; INTRALESIONAL; INTRAMUSCULAR; INTRAVENOUS; SOFT TISSUE
Status: DISCONTINUED | OUTPATIENT
Start: 2018-03-21 | End: 2018-03-21

## 2018-03-21 RX ORDER — CEFAZOLIN SODIUM 2 G/50ML
2 SOLUTION INTRAVENOUS
Status: DISCONTINUED | OUTPATIENT
Start: 2018-03-21 | End: 2018-03-21 | Stop reason: SDUPTHER

## 2018-03-21 RX ORDER — GLYCOPYRROLATE 0.2 MG/ML
INJECTION INTRAMUSCULAR; INTRAVENOUS
Status: DISCONTINUED | OUTPATIENT
Start: 2018-03-21 | End: 2018-03-21

## 2018-03-21 RX ORDER — CHLORHEXIDINE GLUCONATE ORAL RINSE 1.2 MG/ML
10 SOLUTION DENTAL
Status: DISCONTINUED | OUTPATIENT
Start: 2018-03-21 | End: 2018-03-21 | Stop reason: HOSPADM

## 2018-03-21 RX ORDER — HYDROCODONE BITARTRATE AND ACETAMINOPHEN 5; 325 MG/1; MG/1
1 TABLET ORAL EVERY 4 HOURS PRN
Status: DISCONTINUED | OUTPATIENT
Start: 2018-03-21 | End: 2018-03-21 | Stop reason: HOSPADM

## 2018-03-21 RX ORDER — LIDOCAINE HYDROCHLORIDE 10 MG/ML
1 INJECTION, SOLUTION EPIDURAL; INFILTRATION; INTRACAUDAL; PERINEURAL ONCE
Status: DISCONTINUED | OUTPATIENT
Start: 2018-03-21 | End: 2018-03-21 | Stop reason: HOSPADM

## 2018-03-21 RX ADMIN — DEXAMETHASONE SODIUM PHOSPHATE 4 MG: 4 INJECTION, SOLUTION INTRA-ARTICULAR; INTRALESIONAL; INTRAMUSCULAR; INTRAVENOUS; SOFT TISSUE at 08:03

## 2018-03-21 RX ADMIN — LIDOCAINE HYDROCHLORIDE 10 ML: 10 INJECTION, SOLUTION EPIDURAL; INFILTRATION; INTRACAUDAL; PERINEURAL at 07:03

## 2018-03-21 RX ADMIN — PROPOFOL 200 MG: 10 INJECTION, EMULSION INTRAVENOUS at 07:03

## 2018-03-21 RX ADMIN — HYDROCODONE BITARTRATE AND ACETAMINOPHEN 1 TABLET: 5; 325 TABLET ORAL at 09:03

## 2018-03-21 RX ADMIN — SODIUM CHLORIDE, SODIUM LACTATE, POTASSIUM CHLORIDE, AND CALCIUM CHLORIDE: 600; 310; 30; 20 INJECTION, SOLUTION INTRAVENOUS at 07:03

## 2018-03-21 RX ADMIN — PROPOFOL 20 MG: 10 INJECTION, EMULSION INTRAVENOUS at 07:03

## 2018-03-21 RX ADMIN — BUPIVACAINE HYDROCHLORIDE 30 ML: 2.5 INJECTION, SOLUTION EPIDURAL; INFILTRATION; INTRACAUDAL; PERINEURAL at 07:03

## 2018-03-21 RX ADMIN — MIDAZOLAM 2 MG: 1 INJECTION INTRAMUSCULAR; INTRAVENOUS at 07:03

## 2018-03-21 RX ADMIN — ROBINUL 0.8 MG: 0.2 INJECTION INTRAMUSCULAR; INTRAVENOUS at 08:03

## 2018-03-21 RX ADMIN — PROPOFOL 30 MG: 10 INJECTION, EMULSION INTRAVENOUS at 07:03

## 2018-03-21 RX ADMIN — ACETAMINOPHEN 1000 MG: 10 INJECTION, SOLUTION INTRAVENOUS at 08:03

## 2018-03-21 RX ADMIN — LIDOCAINE HYDROCHLORIDE 80 MG: 10 INJECTION, SOLUTION INFILTRATION; PERINEURAL at 07:03

## 2018-03-21 RX ADMIN — FENTANYL CITRATE 25 MCG: 50 INJECTION, SOLUTION INTRAMUSCULAR; INTRAVENOUS at 08:03

## 2018-03-21 RX ADMIN — ROBINUL 0.2 MG: 0.2 INJECTION INTRAMUSCULAR; INTRAVENOUS at 07:03

## 2018-03-21 RX ADMIN — FENTANYL CITRATE 100 MCG: 50 INJECTION, SOLUTION INTRAMUSCULAR; INTRAVENOUS at 07:03

## 2018-03-21 RX ADMIN — ONDANSETRON 4 MG: 2 INJECTION, SOLUTION INTRAMUSCULAR; INTRAVENOUS at 08:03

## 2018-03-21 RX ADMIN — CEFAZOLIN SODIUM 2 G: 2 SOLUTION INTRAVENOUS at 07:03

## 2018-03-21 RX ADMIN — MEPERIDINE HYDROCHLORIDE 12.5 MG: 50 INJECTION INTRAMUSCULAR; INTRAVENOUS; SUBCUTANEOUS at 08:03

## 2018-03-21 RX ADMIN — ROCURONIUM BROMIDE 50 MG: 10 INJECTION, SOLUTION INTRAVENOUS at 07:03

## 2018-03-21 RX ADMIN — NEOSTIGMINE METHYLSULFATE 5 MG: 1 INJECTION INTRAVENOUS at 08:03

## 2018-03-21 NOTE — DISCHARGE INSTRUCTIONS
General Information:    1. Do not drink alcoholic beverages including beer for 24 hours or as long as you are on pain medication..  2. Do not drive a motor vehicle, operate machinery or power tools, or signs legal papers for 24 hours or as long as you are on pain medication.   3. You may experience light-headedness, dizziness, and sleepiness following surgery. Please do not stay alone. A responsible adult should be with you for this 24 hour period.  4. Go home and rest.  5. Progress slowly to a normal diet unless instructed.  Otherwise, begin with liquids such as soft drinks, then soup and crackers working up to solid foods. Drink plenty of nonalcoholic fluids.  6. Certain anesthetics and pain medications produce nausea and vomiting in certain individuals. If nausea becomes a problem at home, call you doctor.  7. A nurse will be calling you sometime after surgery. Do not be alarmed. This is our way of finding out how you are doing.  8. Several times every hour while you are awake, take 2-3 deep breaths and cough. If you had stomach surgery hold a pillow or rolled towel firmly against your stomach before you cough. This will help with any pain the cough might cause.  9. Several times every hour while you are awake, pump and flex your feet 5-6 times and do foot circles. This will help prevent blood clots.  10. Call your doctor for severe pain, bleeding, fever, or signs or symptoms of infection (pain, swelling, redness, foul odor, drainage).  11. You can contact your doctor anytime by [phone] for the [facility] (at [facility]) or [phone] for the [facility] on [facility].   [email or web address] is another way to contact your doctor if you are an active participant online with [facility].  12. Continue Nozin provided at discharge twice daily for 7 days or until the incision is healed.  See pamphlet or box for instructions.

## 2018-03-21 NOTE — H&P (VIEW-ONLY)
CC: This is a 66-year-old male that complains of bilateral knee pain.    HPI: The patient has a long history of bilateral knee pain.  He does have a history of arthritis as well.  He has taken anti-inflammatories in the past.  He denies any complications related to taking Mobic.  The patient states that the pain is a 7 out of 10.    PMH:    Past Medical History:   Diagnosis Date    BPH (benign prostatic hyperplasia)     Colon polyp     GERD (gastroesophageal reflux disease)     Hyperlipidemia     Hypertension     Iron deficiency anemia, unspecified     jejunal etc erosions 4/15       PSH:    Past Surgical History:   Procedure Laterality Date    KNEE SURGERY      right       Family Hx:  No family history on file.    Allergy:    Review of patient's allergies indicates:   Allergen Reactions    Nsaids (non-steroidal anti-inflammatory drug)      Jejunal erosions       Medication:    Current Outpatient Prescriptions:     diltiaZEM (TIAZAC) 240 MG CpSR, Take 1 capsule (240 mg total) by mouth once daily., Disp: 90 capsule, Rfl: 3    ergocalciferol (VITAMIN D2) 50,000 unit Cap, Take 1 capsule (50,000 Units total) by mouth every 7 days., Disp: 12 capsule, Rfl: 1    esomeprazole (NEXIUM) 40 MG capsule, Take 1 capsule (40 mg total) by mouth before breakfast., Disp: 90 capsule, Rfl: 3    ferrous gluconate (FERGON) 325 MG Tab, Take 1 tablet (325 mg total) by mouth daily with breakfast., Disp: 90 tablet, Rfl: 1    losartan (COZAAR) 50 MG tablet, Take 1 tablet (50 mg total) by mouth once daily., Disp: 90 tablet, Rfl: 3    meloxicam (MOBIC) 15 MG tablet, Take 1 tablet (15 mg total) by mouth once daily., Disp: 90 tablet, Rfl: 2    mometasone (NASONEX) 50 mcg/actuation nasal spray, 2 sprays by Nasal route once daily., Disp: 3 each, Rfl: 3    mometasone (NASONEX) 50 mcg/actuation nasal spray, USE 2 SPRAYS IN EACH       NOSTRIL ONCE DAILY, Disp: 51 g, Rfl: 3    pravastatin (PRAVACHOL) 20 MG tablet, Take 1 tablet (20 mg  "total) by mouth once daily., Disp: 90 tablet, Rfl: 3    tamsulosin (FLOMAX) 0.4 mg Cp24, Take 1 capsule (0.4 mg total) by mouth once daily., Disp: 90 capsule, Rfl: 3    tadalafil (CIALIS) 2.5 mg Tab, Take 1 tablet by mouth once daily., Disp: 90 each, Rfl: 3    Current Facility-Administered Medications:     hylan g-f 20 48 mg/6 mL injection 48 mg, 48 mg, Intra-articular, 1 time in Clinic/HOD, Troy Becerra Sr., MD    Social History:    Social History     Social History    Marital status:      Spouse name: N/A    Number of children: 3    Years of education: N/A     Occupational History    Not on file.     Social History Main Topics    Smoking status: Never Smoker    Smokeless tobacco: Never Used    Alcohol use No    Drug use: No    Sexual activity: No     Other Topics Concern    Not on file     Social History Narrative    [place] moved here 2014;         Vitals:   /81   Pulse (!) 50   Ht 5' 9" (1.753 m)   Wt 85.3 kg (188 lb)   BMI 27.76 kg/m²      ROS:  GENERAL: No fever, chills, fatigability or weight loss.  SKIN: No rashes, itching or changes in color or texture of skin.  HEAD: No headaches or recent head trauma.  EYES: Visual acuity fine. No photophobia, ocular pain or diplopia.  EARS: Denies ear pain, discharge or vertigo.  NOSE: No loss of smell, no epistaxis or postnasal drip.  MOUTH & THROAT: No hoarseness or change in voice. No excessive gum bleeding.  NODES: Denies swollen glands.  CHEST: Denies BISHOP, cyanosis, wheezing, cough and sputum production.  CARDIOVASCULAR: Denies chest pain, PND, orthopnea or reduced exercise tolerance.  ABDOMEN: Appetite fine. No weight loss. Denies diarrhea, abdominal pain, hematemesis or blood in stool.  URINARY: No flank pain, dysuria or hematuria.  PERIPHERAL VASCULAR: No claudication or cyanosis.  NEUROLOGIC: No history of seizures, paralysis, alteration of gait or coordination.  MUSCULOSKELETAL: See HPI    PE:  APPEARANCE: " Well nourished, well developed, in no acute distress.   HEAD: Normocephalic, atraumatic.  EYES: PERRL. EOMI.   EARS: TM's intact. Light reflex normal. No retraction or perforation.   NOSE: Mucosa pink. Airway clear.  MOUTH & THROAT: No tonsillar enlargement. No pharyngeal erythema or exudate. No stridor.  NECK: Supple.   NODES: No cervical, axillary or inguinal lymph node enlargement.  CHEST: Lungs clear to auscultation.  CARDIOVASCULAR: Normal S1, S2. No rubs, murmurs or gallops.  ABDOMEN: Bowel sounds normal. Not distended. Soft. No tenderness or masses.  NEUROLOGIC: Cranial Nerves: II-XII grossly intact, also see MUSCULOSKELETAL  MUSCULOSKELETAL:        left Knee Exam-abnormal    Gait-abnormal  Muscle Appearance:abnormal  Grooming:normal  Spine Alignment-normal  Muscle Atrophy-Positive  Deformities-Negative  Tenderness-Positive  Paresthesias-Negative  Range of Motion         Ext-normal, 0 degrees         Flex-abnormal  Muscle Strength-abnormal  Sensation-normal  Reflexes-normal  Crepitus-Positive                                Swelling-Negative  Effusion- Positive                                Edema-Negative  Lachman-Negative                                Erythema-Negative  [facility]'s-Positive                              Apley Grind-Positive  Patellar Comp-Positive                         Alignment-normal/symmetric  Patellar Apprehension-Negative              Synovial fullness-Positive  Passive Patellar Tilt-normal  Patellar Tracking-normal   Patellar Glide-normal  Q-Angle at 90 degrees-normal  Patellar Grind-abnormal  [id number]  Fatigue-Negative                                     HS Tightness-Negative  Tests on Exam, No ligamentous laxity  Neurovascular Status-normal+2 DP and PT artery pulses  Skin-normal         Right  Knee Exam-abnormal    Gait-abnormal  Muscle Appearance:abnormal  Grooming:normal  Spine Alignment-normal  Muscle  Atrophy-Positive  Deformities-Negative  Tenderness-Positive  Paresthesias-Negative  Range of Motion         Ext-normal, 0 degrees         Flex-abnormal  Muscle Strength-abnormal  Sensation-normal  Reflexes-normal  Crepitus-Positive                                Swelling-Negative  Effusion- Positive                                Edema-Negative  Lachman-Negative                                Erythema-Negative  [facility]'s-Positive                              Apley Grind-Positive  Patellar Comp-Positive                         Alignment-normal/symmetric  Patellar Apprehension-Negative              Synovial fullness-Positive  Passive Patellar Tilt-normal  Patellar Tracking-normal   Patellar Glide-normal  Q-Angle at 90 degrees-normal  Patellar Grind-abnormal  [id number]  Fatigue-Negative                                     HS Tightness-Negative  Tests on Exam, No ligamentous laxity  Neurovascular Status-normal+2 DP and PT artery pulses  Skin-normal    Assessment:  The patient understands that he does have arthritis as well as a medial meniscus tear.  He is aware that a resection or repair of the medial meniscus with an arthroscope will not cure the arthritis.  He will continue to have symptoms related to his arthritis following an arthroscope.  The arthroscope may be of help in alleviating the pain associated with the meniscal tear.  The patient does report mechanical symptoms associated with the pain localized to the medial side of his knee and the MRI shows a meniscus tear over the medial aspect of the knee.           Diagnosis:              1.  Bilateral knee arthritis               2.  Bilateral knee effusion               3.  Bilateral knee synovitis               4.  Left knee meniscal tear    Diagnostic Studies  MRI-No  X-Ray-No  EMG/NCV-No  Arthrogram-No  Bone Scan-No  CT Scan-No  Doppler-No  ESR-No  CRP-No  CBC with Diff-No   Rheumatoid/Arthritis Panel-No      Plan:                                                  1. PT-yes                                                 2.OT-no                                          3.NSAID-yes                                        4. Narcotics-no                                     5. Wound care-No                                 6. Rest-no                                           7. Surgery-yes,  left knee arthroscope                                       8. KADI Hose-no                                    9. Anticoagulation therapy-no               10. Elevation-no                                     11. Crutches-no                                    12. Walker-no             13. Cane yes                        14. Referral-no                                     15.Injection-no                      16. Splint   /    Cast   /   Cast Shoe-Yes              17. RICE-none            18. Follow up-  3 weeks

## 2018-03-21 NOTE — INTERVAL H&P NOTE
The patient has been examined and the H&P has been reviewed:    I concur with the findings and no changes have occurred since H&P was written.    Anesthesia/Surgery risks, benefits and alternative options discussed and understood by patient/family.          Active Hospital Problems    Diagnosis  POA    Left knee pain [M25.562]  Yes      Resolved Hospital Problems    Diagnosis Date Resolved POA   No resolved problems to display.

## 2018-03-21 NOTE — PROGRESS NOTES
The patient is in pre-op.  I have signed off on his Left knee, the consent is signed.  The H and P is updated and the orders have been placed. I am in room # 6. The patient is in the preop area. The patient has no further questions regarding his planned surgery, possible complications, nor anything else related to his care. I have reviewed the instruments with the Scrub tech. They understand that I may ask for instruments that are not open and they are aware of where to find the instruments.     Troy Becerra M.D.

## 2018-03-21 NOTE — PLAN OF CARE
Updated pt and family on current POC and discharge instructions, no questions noted. Verbalized understanding.

## 2018-03-21 NOTE — TELEPHONE ENCOUNTER
"    Reason for Disposition   Small painless lump at prior IV site  (all triage questions negative)    Answer Assessment - Initial Assessment Questions  1. SYMPTOM:  "What's the main symptom you're concerned about?" (e.g., pain, redness, swelling, pus)      swelling   2. ONSET: "When did the ________  start?"      today  3. IV WHAT: "What kind of IV line do you have?" (e.g., central line, PICC, peripheral IV)      PIV  4. IV WHERE: "Where does the IV enter your body?"      Left hand  5. IV WHEN: "When was this IV put in?"      today  6. IV WHY: "Why do you have this IV line?"      Knee surgery  7. IV RUNNING OK: "Is the IV running OK?" (e.g., running OK, running slowly, not running, unable to flush)       N/A  8. PAIN: "Is there any pain?" If so, ask: "How bad is the pain?"   (e.g., scale 1-10; or mild, moderate, severe)      none  9. OTHER SYMPTOMS: "Do you have any other symptoms?" (e.g., fever, shaking chills, new weakness, pus)      none  10. VISITING NURSE: "Do you have a visiting nurse?" (e.g., home health nurse, IV infusion nurse)        -    Protocols used: ST IV SITE (SKIN) SYMPTOMS-A-AH      "

## 2018-03-21 NOTE — ANESTHESIA POSTPROCEDURE EVALUATION
"Anesthesia Post Evaluation    Patient: Eduardo Krishnamurthy    Procedure(s) Performed: Procedure(s) (LRB):  ARTHROSCOPY-KNEE; left knee (Left)  ARTHROSCOPY-MENISCECTOMY - MEDIAL (Left)  CHONDROPLASTY-KNEE (Left)  DEBRIDEMENT-KNEE (Left)  SYNOVECTOMY-KNEE (Left)  MICROFRACTURE (Left)    Final Anesthesia Type: general  Patient location during evaluation: PACU  Patient participation: Yes- Able to Participate  Level of consciousness: awake and alert  Post-procedure vital signs: reviewed and stable  Pain management: adequate  Airway patency: patent  PONV status at discharge: No PONV  Anesthetic complications: no      Cardiovascular status: stable  Respiratory status: unassisted  Hydration status: euvolemic  Follow-up not needed.        Visit Vitals  BP (!) 115/57   Pulse (!) 56   Temp 36.3 °C (97.3 °F) (Temporal)   Resp 13   Ht 5' 9" (1.753 m)   Wt 78.9 kg (173 lb 15.1 oz)   SpO2 100%   BMI 25.69 kg/m²       Pain/Megan Score: Presence of Pain: complains of pain/discomfort (3/21/2018  8:45 AM)  Pain Rating Prior to Med Admin: 5 (3/21/2018  9:13 AM)  Megan Score: 8 (3/21/2018  8:45 AM)      "

## 2018-03-21 NOTE — OP NOTE
OPERATIVE DICTATION:    DATE OF SERVICE: 03/21/2018      Preoperative Diagnosis: Left knee medial meniscus tear    Postoperative Diagnosis:   Left knee medial meniscus tear, chondromalacia grade 3 of the patella, synovitis, chondromalacia grade 4 of the trochlea.  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    Procedure: Left knee arthroscope with partial endoscopic medial meniscectomy, partial synovectomy, abrasion arthroplasty and microfracture of the trochlea.  Chondroplasty of the patella.  Chondroplasty of the mediofemoral condyle.    Indication for surgery: Persistent knee pain that interfered with activities of daily living.    Anesthesia:  Gen. anesthesia    Complications:  None    Surgeon: Troy Becerra M.D.     Specimen: None    Assistant:  TESSIE Truong. His assistance was critical and necesssary with positioning of the extremity throughout the surgical procedure.The physician assistant allowed me to access areas of the knee that could not be possible without the assistance of a trained orthopedic physician assistant.  His assistance was critical to allowing me to provide the highest level of care to the patient.      Operative procedure:  The patient brought to operating room after appropriate consent and placed under general anesthesia with intubation.  The patient placed in the supine position with the left lower extremity in the arthroscopy leg valle.  The timeout was performed and the correct extremity identified.  The patient did receive preoperative IV antibiotics prior to inflating the tourniquet.  The left lower extremity was prepped with alcohol, chlorhexidine and sterilely draped.  The Esmarch used for exsanguination and tourniquet inflated to 300 mmHg pressure.  Inferior medial and inferolateral portals made.  The cannula inserted and the camera inserted.  Patient noted to have inflamed synovium throughout the joint.  Shaver inserted and the partial synovectomy performed.  The  patient noted to have laxity of the posterior horn of the medial meniscus.  The shaver and biter inserted and the partial endoscopic medial meniscectomy was performed.  The remnant was beveled and stable.  The patient noted to have grade 3 chondromalacia of the mediofemoral condyle.  Shaver inserted and the chondroplasty performed there.  The patient noted to have inflamed synovium and intercondylar notch.  The shaver inserted the synovectomy performed there as well.  The patient noted to have an anterior cruciate ligament was intact and the PCL intact.  Patient had minimal atrophy of the anterior cruciate ligament.  The lateral compartment the patient noted to have inflamed synovium protruding into the joint.  The patient noted to have grade 3 chondral malacia of the lateral tibial plateau.  The shaver inserted in the pocket the condylar performed.  The attention turned to the patellofemoral joint.  The patient noted to have a well tracking patella.  The patient noted to have grade 3 chondral malacia of the patella.  The shaver inserted and the chondroplasty performed there.  The patient noted to have grade 4 chondromalacia of the trochlea.  The curette inserted and the abrasion arthroplasty performed.  The awl inserted and the microfracture performed.  The shaver used to remove all debris.  The medial and posterior lateral corners of the knee were clear following the synovectomy.  Fluid exsanguinated from the knee and the inferior medial and inferolateral portals closed using interrupted 3-0 nylon sutures.  The patient injected with 0.25% Marcaine and 1% Xylocaine plain.  Betadine gauze and an Ace wrap applied.  The patient tourniquet was deflated.  The patient tolerated procedure well.                 Troy Becerra M.D.

## 2018-03-21 NOTE — PROGRESS NOTES
The patient has been examined and the H&P has been reviewed:     Patient states that no changes have occurred since H&P was written. He elects to proceed with a Left knee arthroscope.     Anesthesia/Surgery risks, benefits and alternative options discussed and understood by patient/family.Patient has followed all pre-op instructions. All questions have been answered.            There are no hospital problems to display for this patient.

## 2018-03-21 NOTE — TRANSFER OF CARE
"Anesthesia Transfer of Care Note    Patient: Eduardo Krishnamurthy    Procedure(s) Performed: Procedure(s) (LRB):  ARTHROSCOPY-KNEE; left knee (Left)  ARTHROSCOPY-MENISCECTOMY - MEDIAL (Left)  CHONDROPLASTY-KNEE (Left)  DEBRIDEMENT-KNEE (Left)  SYNOVECTOMY-KNEE (Left)  MICROFRACTURE (Left)    Patient location: PACU    Anesthesia Type: general    Transport from OR: Transported from OR on room air with adequate spontaneous ventilation    Post pain: adequate analgesia    Post vital signs: stable    Level of consciousness: awake    Nausea/Vomiting: no nausea/vomiting    Complications: none    Transfer of care protocol was followed      Last vitals:   Visit Vitals  /83   Pulse 72   Temp 36.3 °C (97.3 °F) (Temporal)   Resp 15   Ht 5' 9" (1.753 m)   Wt 78.9 kg (173 lb 15.1 oz)   SpO2 98%   BMI 25.69 kg/m²     "

## 2018-03-21 NOTE — DISCHARGE SUMMARY
[facility] -   Brief Operative Note     SUMMARY     Surgery Date: 3/21/2018     Surgeon(s) and Role:     * Troy Becerra Sr., MD - Primary    Assisting Surgeon: None    Pre-op Diagnosis:  Meniscus, lateral, derangement, left [M23.301]    Post-op Diagnosis:  Post-Op Diagnosis Codes:     * Meniscus, lateral, derangement, left [M23.301]  Left knee medial meniscus tear, chondromalacia grade 3 of the patella, synovitis, chondromalacia grade 4 of the trochlea.  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    Procedure(s) (LRB):  ARTHROSCOPY-KNEE; left knee (Left)  ARTHROSCOPY-MENISCECTOMY - MEDIAL (Left)  CHONDROPLASTY-KNEE (Left)  DEBRIDEMENT-KNEE (Left)  SYNOVECTOMY-KNEE (Left)  MICROFRACTURE (Left)    Anesthesia: General    Description of the findings of the procedure:  Left knee medial meniscus tear, chondromalacia grade 3 of the patella, synovitis, chondromalacia grade 4 of the trochlea.  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    Findings/Key Components:  Left knee medial meniscus tear, chondromalacia grade 3 of the patella, synovitis, chondromalacia grade 4 of the trochlea.  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    Estimated Blood Loss: 2cc         Specimens:   Specimen (12h ago through future)    None          Discharge Note    SUMMARY     Admit Date: 3/21/2018    Discharge Date and Time:  03/21/2018 8:27 AM    Hospital Course (synopsis of major diagnoses, care, treatment, and services provided during the course of the hospital stay): without complications      Final Diagnosis: Post-Op Diagnosis Codes:     * Meniscus, lateral, derangement, left [M23.301]  Left knee medial meniscus tear, chondromalacia grade 3 of the patella, synovitis, chondromalacia grade 4 of the trochlea.  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    Disposition: Home or Self Care    Follow Up/Patient Instructions: Follow-up in 2 weeks, resume regular diet, daily dressing changes with  Betadine gauze and an Ace wrap.    Medications: [place].  Reconciled Home Medications:   Current Discharge Medication List      START taking these medications    Details   hydrocodone-acetaminophen 10-325mg (NORCO)  mg Tab Take 1 tablet by mouth every 4 (four) hours as needed.  Qty: 60 tablet, Refills: 0         CONTINUE these medications which have NOT CHANGED    Details   diltiaZEM (TIAZAC) 240 MG CpSR Take 1 capsule (240 mg total) by mouth once daily.  Qty: 90 capsule, Refills: 3      esomeprazole (NEXIUM) 40 MG capsule Take 1 capsule (40 mg total) by mouth before breakfast.  Qty: 90 capsule, Refills: 3      losartan (COZAAR) 50 MG tablet Take 1 tablet (50 mg total) by mouth once daily.  Qty: 90 tablet, Refills: 3      meloxicam (MOBIC) 15 MG tablet Take 1 tablet (15 mg total) by mouth once daily.  Qty: 90 tablet, Refills: 2      nabumetone (RELAFEN) 500 MG tablet TAKE 1 TABLET(500 MG) BY MOUTH EVERY DAY  Qty: 30 tablet, Refills: 0      pravastatin (PRAVACHOL) 20 MG tablet Take 1 tablet (20 mg total) by mouth once daily.  Qty: 90 tablet, Refills: 3    Associated Diagnoses: Hyperlipidemia, unspecified hyperlipidemia type      tamsulosin (FLOMAX) 0.4 mg Cp24 Take 1 capsule (0.4 mg total) by mouth once daily.  Qty: 90 capsule, Refills: 3      ergocalciferol (VITAMIN D2) 50,000 unit Cap Take 1 capsule (50,000 Units total) by mouth every 7 days.  Qty: 12 capsule, Refills: 1      ferrous gluconate (FERGON) 325 MG Tab Take 1 tablet (325 mg total) by mouth daily with breakfast.  Qty: 90 tablet, Refills: 1      mometasone (NASONEX) 50 mcg/actuation nasal spray 2 sprays by Nasal route once daily.  Qty: 3 each, Refills: 3      tadalafil (CIALIS) 2.5 mg Tab Take 1 tablet by mouth once daily.  Qty: 90 each, Refills: 3             Discharge Procedure Orders  Referral to Physical therapy   Referral Priority: Routine Referral Type: Physical Medicine   Referral Reason: Specialty Services Required    Requested Specialty:  Physical Therapy    Number of Visits Requested: 1      Diet general     Call MD for:  temperature >100.4     Call MD for:  persistent nausea and vomiting     Call MD for:  severe uncontrolled pain     Call MD for:  difficulty breathing, headache or visual disturbances     Call MD for:  redness, tenderness, or signs of infection (pain, swelling, redness, odor or green/yellow discharge around incision site)     Call MD for:  hives     Call MD for:  persistent dizziness or light-headedness     Call MD for:  extreme fatigue     Remove dressing in 24 hours   Order Comments: Apply Betadine gauze and an Ace wrap daily.     Activity as tolerated   Order Comments: Activities as tolerated.  Loosen the Ace wrap and elevate the left lower extremity to 5 cm above the level of the heart if there is swelling below the Ace wrap.       Follow-up Information     Call Troy Becerra Sr, MD.    Specialty:  Orthopedic Surgery  Why:  As needed, If symptoms worsen, For suture removal, For wound re-check  Contact information:  [address]  [place] [address] [phone]

## 2018-03-22 ENCOUNTER — OFFICE VISIT (OUTPATIENT)
Dept: INTERNAL MEDICINE | Facility: CLINIC | Age: 68
End: 2018-03-22
Payer: MEDICARE

## 2018-03-22 ENCOUNTER — HOSPITAL ENCOUNTER (EMERGENCY)
Facility: HOSPITAL | Age: 68
Discharge: HOME | End: 2018-03-22
Attending: EMERGENCY MEDICINE
Payer: MEDICARE

## 2018-03-22 ENCOUNTER — TELEPHONE (OUTPATIENT)
Dept: ORTHOPEDICS | Facility: CLINIC | Age: 68
End: 2018-03-22

## 2018-03-22 ENCOUNTER — PATIENT MESSAGE (OUTPATIENT)
Dept: ORTHOPEDICS | Facility: CLINIC | Age: 68
End: 2018-03-22

## 2018-03-22 VITALS
RESPIRATION RATE: 18 BRPM | DIASTOLIC BLOOD PRESSURE: 89 MMHG | TEMPERATURE: 97 F | OXYGEN SATURATION: 98 % | SYSTOLIC BLOOD PRESSURE: 197 MMHG | HEART RATE: 80 BPM

## 2018-03-22 VITALS
DIASTOLIC BLOOD PRESSURE: 92 MMHG | HEIGHT: 69 IN | OXYGEN SATURATION: 98 % | HEART RATE: 72 BPM | SYSTOLIC BLOOD PRESSURE: 144 MMHG | TEMPERATURE: 98 F | WEIGHT: 182.75 LBS | BODY MASS INDEX: 27.07 KG/M2

## 2018-03-22 DIAGNOSIS — R39.198 DIFFICULTY URINATING: Primary | ICD-10-CM

## 2018-03-22 DIAGNOSIS — R11.10 VOMITING, INTRACTABILITY OF VOMITING NOT SPECIFIED, PRESENCE OF NAUSEA NOT SPECIFIED, UNSPECIFIED VOMITING TYPE: ICD-10-CM

## 2018-03-22 DIAGNOSIS — R10.9 ABDOMINAL DISCOMFORT: ICD-10-CM

## 2018-03-22 DIAGNOSIS — K59.00 CONSTIPATION, UNSPECIFIED CONSTIPATION TYPE: Primary | ICD-10-CM

## 2018-03-22 PROCEDURE — 99284 EMERGENCY DEPT VISIT MOD MDM: CPT | Mod: 27

## 2018-03-22 PROCEDURE — 99499 UNLISTED E&M SERVICE: CPT | Mod: S$PBB,,, | Performed by: NURSE PRACTITIONER

## 2018-03-22 PROCEDURE — 99213 OFFICE O/P EST LOW 20 MIN: CPT | Mod: PBBFAC,PO | Performed by: NURSE PRACTITIONER

## 2018-03-22 PROCEDURE — 99999 PR PBB SHADOW E&M-EST. PATIENT-LVL III: CPT | Mod: PBBFAC,,, | Performed by: NURSE PRACTITIONER

## 2018-03-22 RX ORDER — POLYETHYLENE GLYCOL 3350 17 G/17G
POWDER, FOR SOLUTION ORAL
Qty: 238 G | Refills: 0 | Status: SHIPPED | OUTPATIENT
Start: 2018-03-22 | End: 2019-08-15

## 2018-03-22 NOTE — ED PROVIDER NOTES
Encounter Date: 3/22/2018       History     Chief Complaint   Patient presents with    Constipation     constipation from hydrocodone     67 year old male with complaint of constipation X 2 days.  Pt reports that constipation began after taking Lortab. Reports vomiting once this morning.  No abdominal pain. No fever or chills. No other complaints.           Review of patient's allergies indicates:   Allergen Reactions    Nsaids (non-steroidal anti-inflammatory drug)      Jejunal erosions     Past Medical History:   Diagnosis Date    BPH (benign prostatic hyperplasia)     GERD (gastroesophageal reflux disease)     Hypertension     Iron deficiency anemia, unspecified     jejunal etc erosions 4/15     Past Surgical History:   Procedure Laterality Date    KNEE SURGERY      right     Family History   Problem Relation Age of Onset    No Known Problems Mother     Hypertension Father     Hypertension Sister     Stroke Sister      Social History   Substance Use Topics    Smoking status: Never Smoker    Smokeless tobacco: Never Used    Alcohol use No     Review of Systems   Constitutional: Negative for fever.   HENT: Negative for sore throat.    Respiratory: Negative for shortness of breath.    Cardiovascular: Negative for chest pain.   Gastrointestinal: Positive for constipation. Negative for nausea.   Genitourinary: Negative for dysuria.   Musculoskeletal: Negative for back pain.   Skin: Negative for rash.   Neurological: Negative for weakness.   Hematological: Does not bruise/bleed easily.       Physical Exam     Initial Vitals [03/22/18 1407]   BP Pulse Resp Temp SpO2   (!) 197/89 80 18 97.4 °F (36.3 °C) 98 %      MAP       125         Physical Exam    Nursing note and vitals reviewed.  Constitutional: He appears well-developed and well-nourished.   HENT:   Head: Normocephalic and atraumatic.   Eyes: Conjunctivae are normal. Pupils are equal, round, and reactive to light.   Neck: Normal range of motion. Neck  supple.   Cardiovascular: Normal rate, regular rhythm, normal heart sounds and intact distal pulses.   Pulmonary/Chest: Breath sounds normal.   Abdominal: Soft. There is no rebound and no guarding.   Genitourinary:   Genitourinary Comments: No fecal impaction noted upon digital rectal exam   Musculoskeletal: Normal range of motion.   Neurological: He is alert.   Skin: Skin is warm and dry.   Psychiatric: He has a normal mood and affect. His behavior is normal. Thought content normal.       Imaging Results          X-Ray Abdomen Flat And Erect (Final result)  Result time 03/22/18 15:19:03    Final result by Max Dempsey MD (Timothy) (03/22/18 15:19:03)                 Impression:     Air-fluid levels involving the colon with mild distention.  This could represent colonic ileus.  Distal colon obstruction cannot be excluded.  Correlate.      Electronically signed by: MAX DEMPSEY MD  Date:     03/22/18  Time:    15:19              Narrative:    Abdomen, 2 views    Clinical history: Constipation    Findings: There appear be air-fluid levels present involving the colon with mild distention.  This could represent colonic ileus.  Colonic obstruction cannot be excluded.  Correlate.  There are no unusual calcifications. The bones are unremarkable. There is no intraperitoneal free air. The lung bases are clear.                                ED Course   Procedures  Labs Reviewed   CBC W/ AUTO DIFFERENTIAL   COMPREHENSIVE METABOLIC PANEL   LIPASE         Results for orders placed or performed in visit on 03/15/18   CBC auto differential   Result Value Ref Range    WBC 6.13 3.90 - 12.70 K/uL    RBC 5.14 4.60 - 6.20 M/uL    Hemoglobin 13.0 (L) 14.0 - 18.0 g/dL    Hematocrit 40.5 40.0 - 54.0 %    MCV 79 (L) 82 - 98 fL    MCH 25.3 (L) 27.0 - 31.0 pg    MCHC 32.1 32.0 - 36.0 g/dL    RDW 15.4 (H) 11.5 - 14.5 %    Platelets 174 150 - 350 K/uL    MPV 10.2 9.2 - 12.9 fL    Gran # (ANC) 2.7 1.8 - 7.7 K/uL    Lymph # 2.9 1.0 - 4.8  K/uL    Mono # 0.5 0.3 - 1.0 K/uL    Eos # 0.1 0.0 - 0.5 K/uL    Baso # 0.02 0.00 - 0.20 K/uL    Gran% 43.8 38.0 - 73.0 %    Lymph% 46.5 18.0 - 48.0 %    Mono% 7.3 4.0 - 15.0 %    Eosinophil% 2.1 0.0 - 8.0 %    Basophil% 0.3 0.0 - 1.9 %    Differential Method Automated    Comprehensive metabolic panel   Result Value Ref Range    Sodium 137 136 - 145 mmol/L    Potassium 4.4 3.5 - 5.1 mmol/L    Chloride 104 95 - 110 mmol/L    CO2 25 23 - 29 mmol/L    Glucose 92 70 - 110 mg/dL    BUN, Bld 9 8 - 23 mg/dL    Creatinine 1.1 0.5 - 1.4 mg/dL    Calcium 9.7 8.7 - 10.5 mg/dL    Total Protein 7.4 6.0 - 8.4 g/dL    Albumin 4.1 3.5 - 5.2 g/dL    Total Bilirubin 0.7 0.1 - 1.0 mg/dL    Alkaline Phosphatase 73 55 - 135 U/L    AST 22 10 - 40 U/L    ALT 24 10 - 44 U/L    Anion Gap 8 8 - 16 mmol/L    eGFR if African American >60 >60 mL/min/1.73 m^2    eGFR if non African American >60 >60 mL/min/1.73 m^2           Medical Decision Making:   3:44 PM  Pt reports that he feels better after having bowel movement in the ER while waiting.  Explained to patient that he may have a colonic mass that could be causing constipation.  Pt reports that he believes he was constipated from taking Lortab. Pt wants to follow up with PCP and will return for worsening.               Attending Attestation:     Physician Attestation Statement for NP/PA:   I have conducted a face to face encounter with this patient in addition to the NP/PA, due to NP/PA Request    Other NP/PA Attestation Additions:      Medical Decision Making: Recommended CT of the abdomen have the patient chooses to treat medically and follow up with primary care doctor for recheck.  He'll return to emergency department for any worsening signs or symptoms.                   Clinical Impression:   The encounter diagnosis was Constipation, unspecified constipation type.    Disposition:   Disposition: Discharged  Condition: Stable                        Yao Hernandez NP  03/22/18 154        Junior Kitchen MD  03/22/18 0397

## 2018-03-22 NOTE — PROGRESS NOTES
Subjective:       Patient ID: Eduardo Krishnamurthy is a 67 y.o. male.    Chief Complaint: Hypertension; issues after surgery on yesterday (buring in chest); and Foot Swelling (left foot)    Patient presents with abdominal discomfort, nausea, vomiting (x 2 this morning).  Reports having arthroscopy to left knee on yesterday.  Reports normal bowel movement on yesterday morning prior to surgery.  Reports not being able urinate today.        Review of Systems   Constitutional: Negative for chills and fever.   Respiratory: Negative for cough and shortness of breath.    Gastrointestinal: Positive for abdominal distention and nausea.   Genitourinary: Positive for difficulty urinating (unable to urinate ).   Musculoskeletal: Positive for arthralgias and gait problem.   Neurological: Negative for dizziness and headaches.   Psychiatric/Behavioral: Negative for agitation and confusion.       Objective:      Physical Exam   Constitutional: He is oriented to person, place, and time. He appears well-developed and well-nourished.   HENT:   Head: Normocephalic and atraumatic.   Cardiovascular: Normal rate.    Pulmonary/Chest: Effort normal.   Abdominal: He exhibits distension.   Musculoskeletal: He exhibits edema.   Neurological: He is alert and oriented to person, place, and time.       Assessment:       1. Difficulty urinating    2. Vomiting, intractability of vomiting not specified, presence of nausea not specified, unspecified vomiting type    3. Abdominal discomfort        Plan:         Difficulty urinating    Vomiting, intractability of vomiting not specified, presence of nausea not specified, unspecified vomiting type    Abdominal discomfort      Advised to go to ER.  Spoke with Jaja ER nurse at [facility].  Patient transported through private car.

## 2018-03-22 NOTE — TELEPHONE ENCOUNTER
The patient called states L knee ATS done on yesterday 3/21/2018. The patient states the medication is causing constipation, Hydrocodone  mg. He cant eat or drink anything and unable to use the restroom. He is experiencing severe pain in the stomach, and he expects a call back immediately from the provider os nurse. Please be advised.     Thanks,    Pharmacy: [facility]

## 2018-03-22 NOTE — ED NOTES
Patient identifiers verified and correct for Eduardo Krishnamurthy.    LOC: The patient is awake, alert and aware of environment with an appropriate affect, the patient is oriented x 3 and speaking appropriately.  APPEARANCE: Patient resting comfortably and in no acute distress, patient is clean and well groomed, patient's clothing is properly fastened.  SKIN: The skin is warm and dry, color consistent with ethnicity, patient has normal skin turgor and moist mucus membranes, skin intact, no breakdown or bruising noted.  MUSCULOSKELETAL: Patient moving all extremities spontaneously.  RESPIRATORY: Airway is open and patent, respirations are spontaneous.  CARDIAC: Patient has a normal rate, no periphreal edema noted, capillary refill < 3 seconds.  ABDOMEN: Soft and non tender to palpation.

## 2018-03-23 RX ORDER — TRAMADOL HYDROCHLORIDE 50 MG/1
50 TABLET ORAL EVERY 6 HOURS PRN
Qty: 50 TABLET | Refills: 0 | Status: SHIPPED | OUTPATIENT
Start: 2018-03-23 | End: 2018-04-02

## 2018-03-29 VITALS
OXYGEN SATURATION: 98 % | RESPIRATION RATE: 16 BRPM | DIASTOLIC BLOOD PRESSURE: 62 MMHG | HEIGHT: 69 IN | TEMPERATURE: 98 F | SYSTOLIC BLOOD PRESSURE: 107 MMHG | BODY MASS INDEX: 25.76 KG/M2 | HEART RATE: 62 BPM | WEIGHT: 173.94 LBS

## 2018-04-05 ENCOUNTER — OFFICE VISIT (OUTPATIENT)
Dept: ORTHOPEDICS | Facility: CLINIC | Age: 68
End: 2018-04-05
Payer: MEDICARE

## 2018-04-05 VITALS
HEIGHT: 69 IN | RESPIRATION RATE: 18 BRPM | SYSTOLIC BLOOD PRESSURE: 111 MMHG | WEIGHT: 182.75 LBS | HEART RATE: 52 BPM | DIASTOLIC BLOOD PRESSURE: 79 MMHG | BODY MASS INDEX: 27.07 KG/M2

## 2018-04-05 DIAGNOSIS — Z98.890 POSTOPERATIVE STATE: Primary | ICD-10-CM

## 2018-04-05 PROCEDURE — 99213 OFFICE O/P EST LOW 20 MIN: CPT | Mod: PBBFAC,PO | Performed by: ORTHOPAEDIC SURGERY

## 2018-04-05 PROCEDURE — 99999 PR PBB SHADOW E&M-EST. PATIENT-LVL III: CPT | Mod: PBBFAC,,, | Performed by: ORTHOPAEDIC SURGERY

## 2018-04-05 PROCEDURE — 99024 POSTOP FOLLOW-UP VISIT: CPT | Mod: POP,,, | Performed by: ORTHOPAEDIC SURGERY

## 2018-04-05 NOTE — PATIENT INSTRUCTIONS
ACE Wrap  Minor muscle or joint injuries are often treated with an elastic bandage. The bandage provides support and compression to the injured area. An elastic bandage is a stretchy, rolled bandage. Elastic bandages range in width from 2 to 6 inches. They can be used for a variety of injuries. The bandages are often called ACE bandages, after the most common brand name.  If used correctly, elastic bandages help control swelling and ease pain. An elastic bandage is also a good reminder not to overuse the injured area. However, elastic bandages do not provide a lot of support and will not prevent reinjury.  Home care    To apply an elastic bandage:  · Check the skin before wrapping the injury. It should be clean, dry, and free of drainage.  · Start wrapping below the injury and work your way toward the body. For an ankle sprain, start wrapping around the foot and work up toward the calf. This will help control swelling.  · Overlap the edges of the bandage so it stays snuggly in place.  · Wrap the bandage firmly, but not too tightly. A tight bandage can increase swelling on either end of the bandage. Make sure the bandage is wrinkle free.  · Leave fingers and toes exposed.  · Secure ends of the bandage (even self-sticking ones) with clips or tape.  · Check frequently to ensure adequate circulation, especially in the fingers and toes. Loosen the bandage if there is local swelling, numbness, tingling, discomfort, coldness, or discoloration (skin pale or bluish in color).  · Rewrap the bandage as needed during the day. Reroll the bandage as you unwind it.  Continue using the elastic bandage until the pain and swelling are gone or as your healthcare provider advises.  If you have been told to ice the area, the ice can be secured in place with the elastic bandage. Wrap the ice pack with a thin towel to protect the skin. Do not put ice or an ice pack directly on the skin.  Ice the area for no more than 20 minutes at a  time.    Follow-up care  Follow up with your healthcare provider, as advised.  When to seek medical advice  Call your healthcare provider for any of the following:  · Pain and swelling that doesn't get better or gets worse  · Trouble moving injured area  · Skin discoloration, numbness, or tingling that doesnt go away after bandage is removed  Date Last Reviewed: 9/13/2015  © 0925-8713 [employer], "[employer]. [address] [place] [address], [place] [address]. All rights reserved. This information is not intended as a substitute for professional medical care. Always follow your healthcare professional's instructions.

## 2018-04-05 NOTE — PROGRESS NOTES
SUBJECTIVE:  Patient is status post Left knee ATS.  The patient states that his left knee feels better than it did preoperatively.  Patient complains of  2/ 10 pain that is better with the  pain meds and aggravated with movement.      Past Medical History:   Diagnosis Date    BPH (benign prostatic hyperplasia)     GERD (gastroesophageal reflux disease)     Hypertension     Iron deficiency anemia, unspecified     jejunal etc erosions 4/15     Past Surgical History:   Procedure Laterality Date    KNEE SURGERY      right     Review of patient's allergies indicates:   Allergen Reactions    Nsaids (non-steroidal anti-inflammatory drug)      Jejunal erosions     Current Outpatient Prescriptions on File Prior to Visit   Medication Sig Dispense Refill    diltiaZEM (TIAZAC) 240 MG CpSR Take 1 capsule (240 mg total) by mouth once daily. (Patient taking differently: Take 240 mg by mouth every evening. ) 90 capsule 3    ergocalciferol (VITAMIN D2) 50,000 unit Cap Take 1 capsule (50,000 Units total) by mouth every 7 days. 12 capsule 1    esomeprazole (NEXIUM) 40 MG capsule Take 1 capsule (40 mg total) by mouth before breakfast. 90 capsule 3    ferrous gluconate (FERGON) 325 MG Tab Take 1 tablet (325 mg total) by mouth daily with breakfast. (Patient taking differently: Take 325 mg by mouth as needed. ) 90 tablet 1    losartan (COZAAR) 50 MG tablet Take 1 tablet (50 mg total) by mouth once daily. 90 tablet 3    mometasone (NASONEX) 50 mcg/actuation nasal spray 2 sprays by Nasal route once daily. (Patient taking differently: 2 sprays by Nasal route as needed. ) 3 each 3    nabumetone (RELAFEN) 500 MG tablet TAKE 1 TABLET(500 MG) BY MOUTH EVERY DAY 30 tablet 0    polyethylene glycol (GLYCOLAX) 17 gram/dose powder 1 tlbs in 8 ounces of water daily prn constipation 238 g 0    pravastatin (PRAVACHOL) 20 MG tablet Take 1 tablet (20 mg total) by mouth once daily. (Patient taking differently: Take 20 mg by mouth every  "evening. ) 90 tablet 3    tamsulosin (FLOMAX) 0.4 mg Cp24 Take 1 capsule (0.4 mg total) by mouth once daily. (Patient taking differently: Take 0.4 mg by mouth every evening. ) 90 capsule 3    meloxicam (MOBIC) 15 MG tablet Take 1 tablet (15 mg total) by mouth once daily. 90 tablet 2    tadalafil (CIALIS) 2.5 mg Tab Take 1 tablet by mouth once daily. 90 each 3     No current facility-administered medications on file prior to visit.      /79   Pulse (!) 52   Resp 18   Ht 5' 9" (1.753 m)   Wt 82.9 kg (182 lb 12.2 oz)   BMI 26.99 kg/m²    ROS:  GENERAL: No fever, chills, fatigability or weight loss.  SKIN: No rashes, itching or changes in color or texture of skin.  HEAD: No headaches or recent head trauma.  EYES: Visual acuity fine. No photophobia, ocular pain or diplopia.  EARS: Denies ear pain, discharge or vertigo.  NOSE: No loss of smell, no epistaxis or postnasal drip.  MOUTH & THROAT: No hoarseness or change in voice. No excessive gum bleeding.  NODES: Denies swollen glands.  CHEST: Denies BISHOP, cyanosis, wheezing, cough and sputum production.  CARDIOVASCULAR: Denies chest pain, PND, orthopnea or reduced exercise tolerance.  ABDOMEN: Appetite fine. No weight loss. Denies diarrhea, abdominal pain, hematemesis or blood in stool.  URINARY: No flank pain, dysuria or hematuria.  PERIPHERAL VASCULAR: No claudication or cyanosis.  NEUROLOGIC: No history of seizures, paralysis, alteration of gait or coordination.  MUSCULOSKELETAL: See HPI    PE:  APPEARANCE: Well nourished, well developed, in no acute distress.   HEAD: Normocephalic, atraumatic.  EYES: PERRL. EOMI.   EARS: TM's intact. Light reflex normal. No retraction or perforation.   NOSE: Mucosa pink. Airway clear.  MOUTH & THROAT: No tonsillar enlargement. No pharyngeal erythema or exudate. No stridor.  NECK: Supple.   NODES: No cervical, axillary or inguinal lymph node enlargement.  CHEST: Lungs clear to auscultation.  CARDIOVASCULAR: Normal S1, S2. No " rubs, murmurs or gallops.  ABDOMEN: Bowel sounds normal. Not distended. Soft. No tenderness or masses.  NEUROLOGIC: Cranial Nerves: II-XII grossly intact, also see MUSCULOSKELETAL  MUSCULOSKELETAL:        Left Knee  -dressing intact, 2 plus dorsalis pedis and posterior tibial artery pulses, light touch intact Left lower extremity.  All digits are warm. No erythema, no warmth, no drainage, no swelling, no significant tenderness.  Less than 2 seconds capillary refill all digits.      ASSESSMENT:    The patient is stable and improving.      PLAN:  Left knee suture removal  Follow up in 2 weeks   Continue pain medication  Continue wound care  Continue physical therapy

## 2018-05-15 RX ORDER — ERGOCALCIFEROL 1.25 MG/1
CAPSULE ORAL
Qty: 12 CAPSULE | Refills: 0 | Status: SHIPPED | OUTPATIENT
Start: 2018-05-15 | End: 2018-08-29 | Stop reason: SDUPTHER

## 2018-05-17 ENCOUNTER — OFFICE VISIT (OUTPATIENT)
Dept: ORTHOPEDICS | Facility: CLINIC | Age: 68
End: 2018-05-17
Payer: MEDICARE

## 2018-05-17 VITALS
HEIGHT: 69 IN | HEART RATE: 59 BPM | DIASTOLIC BLOOD PRESSURE: 70 MMHG | SYSTOLIC BLOOD PRESSURE: 111 MMHG | WEIGHT: 182.75 LBS | BODY MASS INDEX: 27.07 KG/M2

## 2018-05-17 DIAGNOSIS — M17.12 ARTHRITIS OF LEFT KNEE: Primary | ICD-10-CM

## 2018-05-17 DIAGNOSIS — M25.512 LEFT SHOULDER PAIN, UNSPECIFIED CHRONICITY: Primary | ICD-10-CM

## 2018-05-17 PROCEDURE — 20610 DRAIN/INJ JOINT/BURSA W/O US: CPT | Mod: 58,S$PBB,LT, | Performed by: ORTHOPAEDIC SURGERY

## 2018-05-17 PROCEDURE — 99213 OFFICE O/P EST LOW 20 MIN: CPT | Mod: PBBFAC,PO | Performed by: ORTHOPAEDIC SURGERY

## 2018-05-17 PROCEDURE — 99999 PR PBB SHADOW E&M-EST. PATIENT-LVL III: CPT | Mod: PBBFAC,,, | Performed by: ORTHOPAEDIC SURGERY

## 2018-05-17 PROCEDURE — 99024 POSTOP FOLLOW-UP VISIT: CPT | Mod: S$PBB,,, | Performed by: ORTHOPAEDIC SURGERY

## 2018-05-17 RX ORDER — METHYLPREDNISOLONE ACETATE 80 MG/ML
80 INJECTION, SUSPENSION INTRA-ARTICULAR; INTRALESIONAL; INTRAMUSCULAR; SOFT TISSUE
Status: COMPLETED | OUTPATIENT
Start: 2018-05-17 | End: 2018-05-17

## 2018-05-17 RX ADMIN — METHYLPREDNISOLONE ACETATE 80 MG: 80 INJECTION, SUSPENSION INTRALESIONAL; INTRAMUSCULAR; INTRASYNOVIAL; SOFT TISSUE at 11:05

## 2018-05-17 NOTE — PATIENT INSTRUCTIONS
ACE Wrap  Minor muscle or joint injuries are often treated with an elastic bandage. The bandage provides support and compression to the injured area. An elastic bandage is a stretchy, rolled bandage. Elastic bandages range in width from 2 to 6 inches. They can be used for a variety of injuries. The bandages are often called ACE bandages, after the most common brand name.  If used correctly, elastic bandages help control swelling and ease pain. An elastic bandage is also a good reminder not to overuse the injured area. However, elastic bandages do not provide a lot of support and will not prevent reinjury.  Home care    To apply an elastic bandage:  · Check the skin before wrapping the injury. It should be clean, dry, and free of drainage.  · Start wrapping below the injury and work your way toward the body. For an ankle sprain, start wrapping around the foot and work up toward the calf. This will help control swelling.  · Overlap the edges of the bandage so it stays snuggly in place.  · Wrap the bandage firmly, but not too tightly. A tight bandage can increase swelling on either end of the bandage. Make sure the bandage is wrinkle free.  · Leave fingers and toes exposed.  · Secure ends of the bandage (even self-sticking ones) with clips or tape.  · Check frequently to ensure adequate circulation, especially in the fingers and toes. Loosen the bandage if there is local swelling, numbness, tingling, discomfort, coldness, or discoloration (skin pale or bluish in color).  · Rewrap the bandage as needed during the day. Reroll the bandage as you unwind it.  Continue using the elastic bandage until the pain and swelling are gone or as your healthcare provider advises.  If you have been told to ice the area, the ice can be secured in place with the elastic bandage. Wrap the ice pack with a thin towel to protect the skin. Do not put ice or an ice pack directly on the skin.  Ice the area for no more than 20 minutes at a  time.    Follow-up care  Follow up with your healthcare provider, as advised.  When to seek medical advice  Call your healthcare provider for any of the following:  · Pain and swelling that doesn't get better or gets worse  · Trouble moving injured area  · Skin discoloration, numbness, or tingling that doesnt go away after bandage is removed  Date Last Reviewed: 9/13/2015  © 7927-5223 [employer], "[employer]". [address] [place] [address], [place] [address]. All rights reserved. This information is not intended as a substitute for professional medical care. Always follow your healthcare professional's instructions.

## 2018-05-17 NOTE — PROGRESS NOTES
SUBJECTIVE:  Patient is status post Left knee ATS.  The patient complains of left shoulder pain.  The patient states that his left knee feels better than it did preoperatively.  Patient complains of  2/ 10 pain that is better with the  pain meds and aggravated with movement.      Past Medical History:   Diagnosis Date    BPH (benign prostatic hyperplasia)     GERD (gastroesophageal reflux disease)     Hypertension     Iron deficiency anemia, unspecified     jejunal etc erosions 4/15     Past Surgical History:   Procedure Laterality Date    KNEE SURGERY      right     Review of patient's allergies indicates:   Allergen Reactions    Nsaids (non-steroidal anti-inflammatory drug)      Jejunal erosions     Current Outpatient Prescriptions on File Prior to Visit   Medication Sig Dispense Refill    diltiaZEM (TIAZAC) 240 MG CpSR Take 1 capsule (240 mg total) by mouth once daily. (Patient taking differently: Take 240 mg by mouth every evening. ) 90 capsule 3    ergocalciferol (ERGOCALCIFEROL) 50,000 unit Cap TAKE 1 CAPSULE BY MOUTH EVERY 7 DAYS 12 capsule 0    esomeprazole (NEXIUM) 40 MG capsule Take 1 capsule (40 mg total) by mouth before breakfast. 90 capsule 3    ferrous gluconate (FERGON) 325 MG Tab Take 1 tablet (325 mg total) by mouth daily with breakfast. (Patient taking differently: Take 325 mg by mouth as needed. ) 90 tablet 1    losartan (COZAAR) 50 MG tablet Take 1 tablet (50 mg total) by mouth once daily. 90 tablet 3    mometasone (NASONEX) 50 mcg/actuation nasal spray 2 sprays by Nasal route once daily. (Patient taking differently: 2 sprays by Nasal route as needed. ) 3 each 3    nabumetone (RELAFEN) 500 MG tablet TAKE 1 TABLET(500 MG) BY MOUTH EVERY DAY 30 tablet 0    polyethylene glycol (GLYCOLAX) 17 gram/dose powder 1 tlbs in 8 ounces of water daily prn constipation 238 g 0    pravastatin (PRAVACHOL) 20 MG tablet Take 1 tablet (20 mg total) by mouth once daily. (Patient taking differently:  "Take 20 mg by mouth every evening. ) 90 tablet 3    tamsulosin (FLOMAX) 0.4 mg Cp24 Take 1 capsule (0.4 mg total) by mouth once daily. (Patient taking differently: Take 0.4 mg by mouth every evening. ) 90 capsule 3    tadalafil (CIALIS) 2.5 mg Tab Take 1 tablet by mouth once daily. 90 each 3    [DISCONTINUED] meloxicam (MOBIC) 15 MG tablet Take 1 tablet (15 mg total) by mouth once daily. 90 tablet 2     No current facility-administered medications on file prior to visit.      /70   Pulse (!) 59   Ht 5' 9" (1.753 m)   Wt 82.9 kg (182 lb 12.2 oz)   BMI 26.99 kg/m²    ROS:  GENERAL: No fever, chills, fatigability or weight loss.  SKIN: No rashes, itching or changes in color or texture of skin.  HEAD: No headaches or recent head trauma.  EYES: Visual acuity fine. No photophobia, ocular pain or diplopia.  EARS: Denies ear pain, discharge or vertigo.  NOSE: No loss of smell, no epistaxis or postnasal drip.  MOUTH & THROAT: No hoarseness or change in voice. No excessive gum bleeding.  NODES: Denies swollen glands.  CHEST: Denies BISHOP, cyanosis, wheezing, cough and sputum production.  CARDIOVASCULAR: Denies chest pain, PND, orthopnea or reduced exercise tolerance.  ABDOMEN: Appetite fine. No weight loss. Denies diarrhea, abdominal pain, hematemesis or blood in stool.  URINARY: No flank pain, dysuria or hematuria.  PERIPHERAL VASCULAR: No claudication or cyanosis.  NEUROLOGIC: No history of seizures, paralysis, alteration of gait or coordination.  MUSCULOSKELETAL: See HPI    PE:  APPEARANCE: Well nourished, well developed, in no acute distress.   HEAD: Normocephalic, atraumatic.  EYES: PERRL. EOMI.   EARS: TM's intact. Light reflex normal. No retraction or perforation.   NOSE: Mucosa pink. Airway clear.  MOUTH & THROAT: No tonsillar enlargement. No pharyngeal erythema or exudate. No stridor.  NECK: Supple.   NODES: No cervical, axillary or inguinal lymph node enlargement.  CHEST: Lungs clear to " auscultation.  CARDIOVASCULAR: Normal S1, S2. No rubs, murmurs or gallops.  ABDOMEN: Bowel sounds normal. Not distended. Soft. No tenderness or masses.  NEUROLOGIC: Cranial Nerves: II-XII grossly intact, also see MUSCULOSKELETAL  MUSCULOSKELETAL:     left Shoulder Exam     Muscle Appearance:Normal  Grooming:Normal  Spine Alignment-Normal  Muscle Atrophy-Yes  Deformities-No  Tenderness-Yes  Paresthesias-No  Range of Motion-decreased         Abd Pure-decreased         Abd Combined-decreased         Ext-decreased         Flex-decreased         Internal Rot-decreased         External Rot-decreased  Muscle Strength-decreased  Sensation-normal  Reflexes-Normal  Crepitus-Yes  Impingement-Yes  Apprehension-No  Fatigue-No  Scapular Winging-No  Muscle Tightness-No  Instability-No  Tests on Exam-no evidence of instability  Neurovascular Status-Normal  Skin-Normal  Positive Drop Arm testNo       Left Knee  - 2 plus dorsalis pedis and posterior tibial artery pulses, light touch intact Left lower extremity.  All digits are warm. No erythema, no warmth, no drainage, no swelling, minimal tenderness over the medial aspect of the knee.  Less than 2 seconds capillary refill all digits.      ASSESSMENT:  I reviewed the intraoperative photographs with the patient. He has a clear understanding that he has arthritis on the inside of his knee.  The patient would benefit from a total knee replacement.  The patient is stable and improving.      PLAN:     Follow up in 12 weeks   Continue pain medication  Continue physical therapy      Injection:  The patient was placed in the supine position with   the left leg near   the end of the bed facing me.  The left knee   was placed over a nonsterile pad.The Knee was   prepped, sterily, with Alcohol and Betadine.  A  Refrigerant  And coolant was used to locally anesthetize the skin   over the superior lateral aspect of the knee.  A one and   a half inch, 27 gauge needle attached to   A 10 cc [facility]  was placed into the lateral joint.   A negative pressure was placed on the needle to   ensure the aspiration was placed into the joint  And not into a blood vessel.  4 cc of a 1%  Lidocaine   was mixed with 1 cc of a 80 mg solution of Depo-Medrol injected.   The patient tolerated the knee injection well.  A Bandage   was placed over the injection site.

## 2018-06-19 ENCOUNTER — PATIENT MESSAGE (OUTPATIENT)
Dept: INTERNAL MEDICINE | Facility: CLINIC | Age: 68
End: 2018-06-19

## 2018-06-19 DIAGNOSIS — Z12.5 SCREENING FOR PROSTATE CANCER: Primary | ICD-10-CM

## 2018-06-19 DIAGNOSIS — I10 ESSENTIAL HYPERTENSION: ICD-10-CM

## 2018-06-19 DIAGNOSIS — D64.9 ANEMIA, UNSPECIFIED TYPE: ICD-10-CM

## 2018-08-01 ENCOUNTER — PATIENT MESSAGE (OUTPATIENT)
Dept: INTERNAL MEDICINE | Facility: CLINIC | Age: 68
End: 2018-08-01

## 2018-08-01 ENCOUNTER — PATIENT MESSAGE (OUTPATIENT)
Dept: ORTHOPEDICS | Facility: CLINIC | Age: 68
End: 2018-08-01

## 2018-08-02 ENCOUNTER — PATIENT MESSAGE (OUTPATIENT)
Dept: INTERNAL MEDICINE | Facility: CLINIC | Age: 68
End: 2018-08-02

## 2018-08-02 DIAGNOSIS — M25.519 SHOULDER PAIN, UNSPECIFIED CHRONICITY, UNSPECIFIED LATERALITY: Primary | ICD-10-CM

## 2018-08-14 ENCOUNTER — PATIENT MESSAGE (OUTPATIENT)
Dept: INTERNAL MEDICINE | Facility: CLINIC | Age: 68
End: 2018-08-14

## 2018-08-20 DIAGNOSIS — M25.512 LEFT SHOULDER PAIN, UNSPECIFIED CHRONICITY: Primary | ICD-10-CM

## 2018-08-23 ENCOUNTER — OFFICE VISIT (OUTPATIENT)
Dept: ORTHOPEDICS | Facility: CLINIC | Age: 68
End: 2018-08-23
Payer: MEDICARE

## 2018-08-23 VITALS
SYSTOLIC BLOOD PRESSURE: 111 MMHG | HEIGHT: 69 IN | DIASTOLIC BLOOD PRESSURE: 72 MMHG | BODY MASS INDEX: 26.96 KG/M2 | HEART RATE: 56 BPM | WEIGHT: 182 LBS

## 2018-08-23 DIAGNOSIS — M17.11 ARTHRITIS OF RIGHT KNEE: ICD-10-CM

## 2018-08-23 DIAGNOSIS — M25.512 LEFT SHOULDER PAIN, UNSPECIFIED CHRONICITY: Primary | ICD-10-CM

## 2018-08-23 PROCEDURE — 99999 PR PBB SHADOW E&M-EST. PATIENT-LVL III: CPT | Mod: PBBFAC,,, | Performed by: ORTHOPAEDIC SURGERY

## 2018-08-23 PROCEDURE — 99213 OFFICE O/P EST LOW 20 MIN: CPT | Mod: PBBFAC,PO | Performed by: ORTHOPAEDIC SURGERY

## 2018-08-23 PROCEDURE — 99214 OFFICE O/P EST MOD 30 MIN: CPT | Mod: S$PBB,,, | Performed by: ORTHOPAEDIC SURGERY

## 2018-08-23 RX ORDER — NABUMETONE 500 MG/1
500 TABLET, FILM COATED ORAL DAILY
Qty: 30 TABLET | Refills: 2 | Status: SHIPPED | OUTPATIENT
Start: 2018-08-23 | End: 2018-09-11

## 2018-08-23 NOTE — PATIENT INSTRUCTIONS
What is Arthritis?  Arthritis is a disease that affects the joints (the parts where bones meet and move). It can affect any joint in your body. There are many types of arthritis, including osteoarthritis and rheumatoid arthrtitis. If your symptoms are mild, medications may be enough to reduce pain and swelling. For more severe arthritis, surgery may be needed to improve the condition of the joint or replace the joint entirely.                  What causes arthritis?  Cartilage is a smooth substance that protects the ends of your bones and provides cushioning. When you have arthritis, this cartilage breaks down and can no longer protect your bones. The bones rub against each other, causing pain and swelling. Over time, bone spurs (small pieces of rough or splintered bone) may develop, and the joint's range of motion can become limited.  Symptoms  Some of the more common symptoms of arthritis include:  · Joint pain and stiffness. Pain and stiffness get worse with long periods of rest or using a joint too long or too hard.  · Joints that have lost normal shape and motion.  · Tender, inflamed joints. They may look red and feel warm.  · Grinding or popping noise with joint movement.   · Feeling tired all the time.  Reducing symptoms  Following a healthy lifestyle by losing weight and exercising can help reduce symptoms of osteoarthritis. Medicines can be very helpful for arthritis.     Date Last Reviewed: 9/10/2015  © 8511-0660 [employer]. [address] [place] [address], [place] [address]. All rights reserved. This information is not intended as a substitute for professional medical care. Always follow your healthcare professional's instructions.

## 2018-08-23 NOTE — PROGRESS NOTES
SUBJECTIVE:  Patient is status post Left knee ATS.  The patient complains of left shoulder pain.  The patient states that his left knee feels better than it did preoperatively.  The pain associated with mechanical symptoms have significantly improved.  Patient complains of right knee pain.  Patient complains of  1/ 10 pain that is better with the  pain meds and aggravated with movement.  He states that he was unable to attend physical therapy because it was scheduled in [place] and he resides in [place].    Past Medical History:   Diagnosis Date    BPH (benign prostatic hyperplasia)     GERD (gastroesophageal reflux disease)     Hypertension     Iron deficiency anemia, unspecified     jejunal etc erosions 4/15     Past Surgical History:   Procedure Laterality Date    KNEE SURGERY      right     Review of patient's allergies indicates:   Allergen Reactions    Nsaids (non-steroidal anti-inflammatory drug)      Jejunal erosions     Current Outpatient Medications on File Prior to Visit   Medication Sig Dispense Refill    diltiaZEM (TIAZAC) 240 MG CpSR Take 1 capsule (240 mg total) by mouth once daily. (Patient taking differently: Take 240 mg by mouth every evening. ) 90 capsule 3    ergocalciferol (ERGOCALCIFEROL) 50,000 unit Cap TAKE 1 CAPSULE BY MOUTH EVERY 7 DAYS 12 capsule 0    esomeprazole (NEXIUM) 40 MG capsule Take 1 capsule (40 mg total) by mouth before breakfast. 90 capsule 3    ferrous gluconate (FERGON) 325 MG Tab Take 1 tablet (325 mg total) by mouth daily with breakfast. (Patient taking differently: Take 325 mg by mouth as needed. ) 90 tablet 1    losartan (COZAAR) 50 MG tablet Take 1 tablet (50 mg total) by mouth once daily. 90 tablet 3    mometasone (NASONEX) 50 mcg/actuation nasal spray 2 sprays by Nasal route once daily. (Patient taking differently: 2 sprays by Nasal route as needed. ) 3 each 3    nabumetone (RELAFEN) 500 MG tablet TAKE 1 TABLET(500 MG) BY MOUTH EVERY DAY 30 tablet 0     "polyethylene glycol (GLYCOLAX) 17 gram/dose powder 1 tlbs in 8 ounces of water daily prn constipation 238 g 0    pravastatin (PRAVACHOL) 20 MG tablet Take 1 tablet (20 mg total) by mouth once daily. (Patient taking differently: Take 20 mg by mouth every evening. ) 90 tablet 3    tamsulosin (FLOMAX) 0.4 mg Cp24 Take 1 capsule (0.4 mg total) by mouth once daily. (Patient taking differently: Take 0.4 mg by mouth every evening. ) 90 capsule 3    tadalafil (CIALIS) 2.5 mg Tab Take 1 tablet by mouth once daily. 90 each 3     No current facility-administered medications on file prior to visit.      /72 (BP Location: Left arm, Patient Position: Sitting, BP Method: Medium (Automatic))   Pulse (!) 56   Ht 5' 9" (1.753 m)   Wt 82.6 kg (182 lb)   BMI 26.88 kg/m²    ROS:  GENERAL: No fever, chills, fatigability or weight loss.  MUSCULOSKELETAL: See HPI    PE:  APPEARANCE: Well nourished, well developed, in no acute distress.   NEUROLOGIC: Cranial Nerves: II-XII grossly intact, also see MUSCULOSKELETAL  MUSCULOSKELETAL:     left Shoulder Exam     Muscle Appearance:Normal  Grooming:Normal  Spine Alignment-Normal  Muscle Atrophy-Yes  Deformities-No  Tenderness-Yes  Paresthesias-No  Range of Motion-decreased         Abd Pure-decreased         Abd Combined-decreased         Ext-decreased         Flex-decreased         Internal Rot-decreased         External Rot-decreased  Muscle Strength-decreased  Sensation-normal  Reflexes-Normal  Crepitus-Yes  Impingement-Yes  Apprehension-No  Fatigue-No  Scapular Winging-No  Muscle Tightness-No  Instability-No  Tests on Exam-no evidence of instability  Neurovascular Status-Normal  Skin-Normal  Positive Drop Arm testNo       Left Knee  - 2 plus dorsalis pedis and posterior tibial artery pulses, light touch intact Left lower extremity.  All digits are warm. No erythema, no warmth, no drainage, no swelling, minimal tenderness over the medial aspect of the knee.  Less than 2 seconds " capillary refill all digits.      ASSESSMENT:    Patient does not wish to have an injection, at this time.      PLAN:     Follow up in 6 months  Continue pain medication  Continue physical therapy

## 2018-08-29 RX ORDER — ERGOCALCIFEROL 1.25 MG/1
CAPSULE ORAL
Qty: 12 CAPSULE | Refills: 0 | Status: SHIPPED | OUTPATIENT
Start: 2018-08-29 | End: 2019-08-15 | Stop reason: SDUPTHER

## 2018-09-05 ENCOUNTER — LAB VISIT (OUTPATIENT)
Dept: LAB | Facility: HOSPITAL | Age: 68
End: 2018-09-05
Attending: FAMILY MEDICINE
Payer: MEDICARE

## 2018-09-05 DIAGNOSIS — Z12.5 SCREENING FOR PROSTATE CANCER: ICD-10-CM

## 2018-09-05 DIAGNOSIS — D64.9 ANEMIA, UNSPECIFIED TYPE: ICD-10-CM

## 2018-09-05 DIAGNOSIS — I10 ESSENTIAL HYPERTENSION: ICD-10-CM

## 2018-09-05 LAB
ANION GAP SERPL CALC-SCNC: 8 MMOL/L
BASOPHILS # BLD AUTO: 0.05 K/UL
BASOPHILS NFR BLD: 1 %
BUN SERPL-MCNC: 9 MG/DL
CALCIUM SERPL-MCNC: 9.6 MG/DL
CHLORIDE SERPL-SCNC: 106 MMOL/L
CHOLEST SERPL-MCNC: 175 MG/DL
CHOLEST/HDLC SERPL: 3.1 {RATIO}
CO2 SERPL-SCNC: 24 MMOL/L
COMPLEXED PSA SERPL-MCNC: 0.92 NG/ML
CREAT SERPL-MCNC: 1 MG/DL
DIFFERENTIAL METHOD: ABNORMAL
EOSINOPHIL # BLD AUTO: 0.2 K/UL
EOSINOPHIL NFR BLD: 3 %
ERYTHROCYTE [DISTWIDTH] IN BLOOD BY AUTOMATED COUNT: 14.8 %
EST. GFR  (AFRICAN AMERICAN): >60 ML/MIN/1.73 M^2
EST. GFR  (NON AFRICAN AMERICAN): >60 ML/MIN/1.73 M^2
FERRITIN SERPL-MCNC: 19 NG/ML
GLUCOSE SERPL-MCNC: 97 MG/DL
HCT VFR BLD AUTO: 41.3 %
HDLC SERPL-MCNC: 56 MG/DL
HDLC SERPL: 32 %
HGB BLD-MCNC: 12.8 G/DL
IMM GRANULOCYTES # BLD AUTO: 0.01 K/UL
IMM GRANULOCYTES NFR BLD AUTO: 0.2 %
LDLC SERPL CALC-MCNC: 98.2 MG/DL
LYMPHOCYTES # BLD AUTO: 2 K/UL
LYMPHOCYTES NFR BLD: 39.4 %
MCH RBC QN AUTO: 24.7 PG
MCHC RBC AUTO-ENTMCNC: 31 G/DL
MCV RBC AUTO: 80 FL
MONOCYTES # BLD AUTO: 0.4 K/UL
MONOCYTES NFR BLD: 8.3 %
NEUTROPHILS # BLD AUTO: 2.4 K/UL
NEUTROPHILS NFR BLD: 48.1 %
NONHDLC SERPL-MCNC: 119 MG/DL
NRBC BLD-RTO: 0 /100 WBC
PLATELET # BLD AUTO: 204 K/UL
PMV BLD AUTO: 11.2 FL
POTASSIUM SERPL-SCNC: 4.5 MMOL/L
RBC # BLD AUTO: 5.19 M/UL
SODIUM SERPL-SCNC: 138 MMOL/L
TRIGL SERPL-MCNC: 104 MG/DL
WBC # BLD AUTO: 5.05 K/UL

## 2018-09-05 PROCEDURE — 82728 ASSAY OF FERRITIN: CPT

## 2018-09-05 PROCEDURE — 84153 ASSAY OF PSA TOTAL: CPT

## 2018-09-05 PROCEDURE — 36415 COLL VENOUS BLD VENIPUNCTURE: CPT | Mod: PO

## 2018-09-05 PROCEDURE — 80048 BASIC METABOLIC PNL TOTAL CA: CPT

## 2018-09-05 PROCEDURE — 80061 LIPID PANEL: CPT

## 2018-09-05 PROCEDURE — 85025 COMPLETE CBC W/AUTO DIFF WBC: CPT

## 2018-09-11 ENCOUNTER — TELEPHONE (OUTPATIENT)
Dept: INTERNAL MEDICINE | Facility: CLINIC | Age: 68
End: 2018-09-11

## 2018-09-11 ENCOUNTER — OFFICE VISIT (OUTPATIENT)
Dept: INTERNAL MEDICINE | Facility: CLINIC | Age: 68
End: 2018-09-11
Payer: MEDICARE

## 2018-09-11 VITALS
HEIGHT: 69 IN | BODY MASS INDEX: 27.13 KG/M2 | HEART RATE: 63 BPM | DIASTOLIC BLOOD PRESSURE: 70 MMHG | SYSTOLIC BLOOD PRESSURE: 114 MMHG | TEMPERATURE: 98 F | OXYGEN SATURATION: 99 % | WEIGHT: 183.19 LBS

## 2018-09-11 DIAGNOSIS — E55.9 VITAMIN D DEFICIENCY: ICD-10-CM

## 2018-09-11 DIAGNOSIS — I10 ESSENTIAL HYPERTENSION: Primary | ICD-10-CM

## 2018-09-11 DIAGNOSIS — D50.9 IRON DEFICIENCY ANEMIA, UNSPECIFIED IRON DEFICIENCY ANEMIA TYPE: ICD-10-CM

## 2018-09-11 DIAGNOSIS — Z12.5 SCREENING FOR PROSTATE CANCER: ICD-10-CM

## 2018-09-11 DIAGNOSIS — E78.5 HYPERLIPIDEMIA, UNSPECIFIED HYPERLIPIDEMIA TYPE: ICD-10-CM

## 2018-09-11 PROCEDURE — 99214 OFFICE O/P EST MOD 30 MIN: CPT | Mod: PBBFAC,PO | Performed by: FAMILY MEDICINE

## 2018-09-11 PROCEDURE — 99999 PR PBB SHADOW E&M-EST. PATIENT-LVL IV: CPT | Mod: PBBFAC,,, | Performed by: FAMILY MEDICINE

## 2018-09-11 PROCEDURE — 90732 PPSV23 VACC 2 YRS+ SUBQ/IM: CPT | Mod: PBBFAC,PO

## 2018-09-11 PROCEDURE — 99214 OFFICE O/P EST MOD 30 MIN: CPT | Mod: S$PBB,,, | Performed by: FAMILY MEDICINE

## 2018-09-11 RX ORDER — TAMSULOSIN HYDROCHLORIDE 0.4 MG/1
0.4 CAPSULE ORAL DAILY
Qty: 90 CAPSULE | Refills: 3 | Status: SHIPPED | OUTPATIENT
Start: 2018-09-11 | End: 2019-09-12

## 2018-09-11 RX ORDER — ESOMEPRAZOLE MAGNESIUM 40 MG/1
40 CAPSULE, DELAYED RELEASE ORAL
Qty: 90 CAPSULE | Refills: 3 | Status: SHIPPED | OUTPATIENT
Start: 2018-09-11 | End: 2019-08-15 | Stop reason: SDUPTHER

## 2018-09-11 RX ORDER — FLUTICASONE PROPIONATE 50 MCG
2 SPRAY, SUSPENSION (ML) NASAL DAILY
Qty: 3 BOTTLE | Refills: 3 | Status: SHIPPED | OUTPATIENT
Start: 2018-09-11 | End: 2019-08-15

## 2018-09-11 RX ORDER — LOSARTAN POTASSIUM 50 MG/1
50 TABLET ORAL DAILY
Qty: 90 TABLET | Refills: 3 | Status: SHIPPED | OUTPATIENT
Start: 2018-09-11 | End: 2019-08-15 | Stop reason: SDUPTHER

## 2018-09-11 RX ORDER — DILTIAZEM HYDROCHLORIDE 240 MG/1
240 CAPSULE, EXTENDED RELEASE ORAL NIGHTLY
Qty: 90 CAPSULE | Refills: 3 | Status: SHIPPED | OUTPATIENT
Start: 2018-09-11 | End: 2019-08-15 | Stop reason: SDUPTHER

## 2018-09-11 RX ORDER — PRAVASTATIN SODIUM 20 MG/1
20 TABLET ORAL DAILY
Qty: 90 TABLET | Refills: 3 | Status: SHIPPED | OUTPATIENT
Start: 2018-09-11 | End: 2019-12-09 | Stop reason: SDUPTHER

## 2018-09-11 NOTE — PROGRESS NOTES
Subjective:       Patient ID: Eduardo Krishnamurthy is a . male.    Chief Complaint:Multiple issues see below      HPI Hypertension: blood pressures at home normal. Tolerating medicine.   Iron def anemia sec jejunal erosions  nsaids ;;back on relafen s/p surg took only once. Not on; not on iron  GERD asympt. Tolerating medication. No swallowing problems  All rhin: infreq nasonex and zyrtec  Vit d defic: weekly vit d ;wants to do otc. On daily            Past Medical History   Diagnosis Date    Hypertension     Hyperlipidemia     GERD (gastroesophageal reflux disease)     BPH (benign prostatic hyperplasia)     Colon polyp     Iron deficiency anemia, unspecified      jejunal etc erosions 4/15     Past Surgical History   Procedure Laterality Date    Knee surgery       right     History reviewed. No pertinent family history.      Review of Systems  Cardiovascular: no chest pain  Chest: no shortness of breath  Abd: no abd pain  Remainder review of systems negative    Objective:      Physical Exam   Constitutional: He is oriented to person, place, and time. He appears well-developed and well-nourished.   HENT:   Head: Normocephalic and atraumatic.   Right Ear: External ear normal.   Left Ear: External ear normal.   Nose: Nose normal.   Mouth/Throat: Oropharynx is clear and moist.   Nl tms   Eyes: Conjunctivae and EOM are normal. Pupils are equal, round, and reactive to light. No scleral icterus.   Neck: Normal range of motion. Neck supple. Carotid bruit is not present.   Cardiovascular: Normal rate, regular rhythm and normal heart sounds.  Exam reveals no gallop and no friction rub.    No murmur heard.  Pulmonary/Chest: Effort normal and breath sounds normal. He has no wheezes.   Abdominal: Soft. Bowel sounds are normal. He exhibits no distension and no mass. There is no hepatosplenomegaly. There is no tenderness. There is no rebound and no guarding.   Musculoskeletal: Normal range of motion. He exhibits no tenderness.    Lymphadenopathy:     He has no cervical adenopathy.   Neurological: He is alert and oriented to person, place, and time. No cranial nerve deficit. Coordination normal.   Skin: Skin is warm and dry. No rash noted. No erythema.   Psychiatric: He has a normal mood and affect. His behavior is normal. Judgment and thought content normal.   Nursing note and vitals reviewed.  b  Assessment:           2. Iron deficiency anemia    3. Hyperlipidemia    4. Essential hypertension        Plan:     D/c nsaids  Lab 12 months and follow up after  Also harpreet ferr vit d few months    Shingrix new shingles vaccine  via a pharmacy  Essential hypertension  -     Lipid panel; Future; Expected date: 09/11/2019  -     Basic metabolic panel; Future; Expected date: 09/11/2019    Screening for prostate cancer  -     PSA, Screening; Future; Expected date: 09/11/2019    Iron deficiency anemia, unspecified iron deficiency anemia type  -     CBC auto differential; Future; Expected date: 09/11/2019  -     Ferritin; Future; Expected date: 09/11/2019  -     CBC auto differential; Future; Expected date: 12/10/2018  -     Ferritin; Future; Expected date: 12/10/2018    Vitamin D deficiency  -     Vitamin D; Future; Expected date: 12/10/2018    Hyperlipidemia, unspecified hyperlipidemia type  -     pravastatin (PRAVACHOL) 20 MG tablet; Take 1 tablet (20 mg total) by mouth once daily.  Dispense: 90 tablet; Refill: 3    Other orders  -     esomeprazole (NEXIUM) 40 MG capsule; Take 1 capsule (40 mg total) by mouth before breakfast.  Dispense: 90 capsule; Refill: 3  -     diltiaZEM (TIAZAC) 240 MG Cs24; Take 1 capsule (240 mg total) by mouth every evening.  Dispense: 90 capsule; Refill: 3  -     losartan (COZAAR) 50 MG tablet; Take 1 tablet (50 mg total) by mouth once daily.  Dispense: 90 tablet; Refill: 3  -     tamsulosin (FLOMAX) 0.4 mg Cap; Take 1 capsule (0.4 mg total) by mouth once daily.  Dispense: 90 capsule; Refill: 3  -     (In Office  Administered) Pneumococcal Polysaccharide Vaccine (23 Valent) (SQ/IM)    add: 12/18 ferritin still low off iron  Please notify iron level still bit low  Please resume iron (erxd) daily few months  Aster ferritin, cbc 6 months

## 2018-09-11 NOTE — TELEPHONE ENCOUNTER
----- Message from Carmel Scott MA sent at 9/11/2018  2:39 PM CDT -----  S/w pt. Pt wants you to send a prescription in for flonase. Please advise

## 2018-09-25 ENCOUNTER — CLINICAL SUPPORT (OUTPATIENT)
Dept: REHABILITATION | Facility: HOSPITAL | Age: 68
End: 2018-09-25
Attending: ORTHOPAEDIC SURGERY
Payer: MEDICARE

## 2018-09-25 DIAGNOSIS — M25.512 ACUTE PAIN OF LEFT SHOULDER: Primary | ICD-10-CM

## 2018-09-25 PROCEDURE — 97035 APP MDLTY 1+ULTRASOUND EA 15: CPT | Mod: PO | Performed by: OCCUPATIONAL THERAPIST

## 2018-09-25 PROCEDURE — 97110 THERAPEUTIC EXERCISES: CPT | Mod: PO | Performed by: OCCUPATIONAL THERAPIST

## 2018-09-25 PROCEDURE — G8988 SELF CARE GOAL STATUS: HCPCS | Mod: CH,PO | Performed by: OCCUPATIONAL THERAPIST

## 2018-09-25 PROCEDURE — G8987 SELF CARE CURRENT STATUS: HCPCS | Mod: CI,PO | Performed by: OCCUPATIONAL THERAPIST

## 2018-09-25 PROCEDURE — 97165 OT EVAL LOW COMPLEX 30 MIN: CPT | Mod: PO | Performed by: OCCUPATIONAL THERAPIST

## 2018-09-25 NOTE — PLAN OF CARE
I CERTIFY THE NEED FOR THESE SERVICES FURNISHED UNDER THIS PLAN OF TREATMENT AND WHILE UNDER MY CARE.     Referring providers comments;              [facility] [id number]  Date of evaluation: 2018     Referring Physician: Troy Becerra Sr., MD  Diagnosis: Left shoulder shoulder  Referral Orders: Eval and Treat 3X WEEK for 90 days to include passive and active range of motion exercises.  Orders  on Dec.25, 2018.    Age: 67 y.o.  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    ergocalciferol (ERGOCALCIFEROL) 50,000 unit Cap TAKE 1 CAPSULE BY MOUTH EVERY 7 DAYS    esomeprazole (NEXIUM) 40 MG capsule Take 1 capsule (40 mg total) by mouth before breakfast.    fluticasone (FLONASE) 50 mcg/actuation nasal spray 2 sprays (100 mcg total) by Each Nare route once daily.    losartan (COZAAR) 50 MG tablet Take 1 tablet (50 mg total) by mouth once daily.    polyethylene glycol (GLYCOLAX) 17 gram/dose powder 1 tlbs in 8 ounces of water daily prn constipation    pravastatin (PRAVACHOL) 20 MG tablet Take 1 tablet (20 mg total) by mouth once daily.    tadalafil (CIALIS) 2.5 mg Tab Take 1 tablet by mouth once daily.    tamsulosin (FLOMAX) 0.4 mg Cap Take 1 capsule (0.4 mg total) by mouth once daily.     No current facility-administered medications for this visit.       Review of patient's allergies indicates:   Allergen Reactions    Nsaids (non-steroidal anti-inflammatory drug)      Jejunal erosions     X-Rays/Tests: None/ordered      Subjective: Pt.reports a 3 month HX of occasional LT shoulder pain without trauma. He is not reporting any pain at rest and only mild pain with activity.He is sedentary at home and at work and has not received therapy in the past.  THE UPPER EXTREMITY FUNCTIONAL SCALE (UEFS) - The following scores are based on patient reported assessment.      0 = extreme difficulty or unable to perform activity; 1 = quite a bit of difficulty; 2 = moderate difficulty; 3 = a little bit of difficulty; 4 = no difficulty    1 Any of your usual work, housework, or school activities     2 Your usual hobbies, re creational or sporting activities      3 Lifting a bag of groceries to waist level        4 Lifting a bag of groceries above your head       5 Grooming your hair           6 Pushing up on your hands (eg from bathtub or chair)      7 Preparing food (eg peeling, cutting)        8 Driving            9 Vacuuming, sweeping or raking         10 Dressing            11 Doing up buttons           12 Using tools or appliances          13 Opening doors           14 Cleaning            15 Tying or lacing shoes          16 Sleeping            17 Laundering clothes (eg washing, ironing, folding)      18 Opening a jar           19 Throwing a ball           20 Carrying a small suitcase with your affected limb        Minimum Level of Detectable Change (90% Confidence): 9 points SCORE: 80/80    Patient reports 1% ability on the Upper Extremity Functional Scale.      Pain:  During no work:0/10  While workin/10  Sleepin/10  Location of pain:    Eduardo's goals for therapy are: Strengthening of muscles.      Objective:  Sensation Test: Patient denies any numbness/tingling    Observation/Inspection   Left Right    Anatomic Symmetry normal normal   Bony normal normal   Suparspinatus normal normal   Infraspinatus normal normal   Rhomboid normal normal     Observation/Inspection:   scapular elevation with attempted shoulder ROM and rounded shoulders / forward head and normal muscle tone for age      Range of Motion:   Right: WNL  Left: limited as follows: (see measurements table below)     (L) UE (R) UE     AROM AROM Norm   Shoulder Flexion   0-180   Shoulder Flexion 170 175 180   Shoulder Abduction 165 175 0-180   Shoulder Extension 40 50 0-50   Shoulder Internal Rotation 80 90 0-90   Shoulder External Rotation 80 90 0-90   Horz Shoulder Adduction 40 40 0-40     ROM Comments:    Firm end feel and Pain at end range    Painful Arc:   Patient demonstrates no painful arc in shoulder flexion or abduction      Strength  Shoulder Flexion RUE: 5/5   Shoulder Extension RUE: 5/5   Shoulder Abduction RUE:  5/5   Shoulder Adduction RUE:  5/5   Internal Rotation RUE: 5/5   External Rotation RUE: 4+/5   Horizontal Adduction RUE: 5/5   Shoulder Flexion LUE: 4+/5   Shoulder Extension LUE: 4/5   Shoulder Abduction LUE: 4/5   Shoulder Abbduction LUE: 4+/5   Internal Rotation LUE:  4+/5   External Rotation LUE:  4/5   Horizontal Adduction LUE:  5/5     Supraspinatus: 4-/5    Pull Tests:  Abduction: NT  Ext. Rotation: NT    Special Tests:  Positive: Int. Rot. and ADD. Impingement  Negative: Apprehension and Speed's Test      Palpation: (for pain)     Positive: Supraspinatus Region/upper trap and levator scap     Negative: SC joint, Coracoid process, Lateral Subacromial Space, Posterior Subacromial Space, Infraspinatus Region and AC joint    Limitations of Functional Status:   Self Care: none reported  Work: none reported  Leisure: none repoprted    Treatment included: OT evaluation, the following exercises (HEP) were instructed and Eduardo was able to demonstrate them prior to the end of the session. HEP are as follows:   Ultrasound x8 min.  Over the anterior/lateral subacromial space 50% with FIR  STM of the LT upper trap and levator scap muscles    Upper trap and levator scap stretches: 3/30 sec.holds    Codman's Exercises:  Clockwise/counterclockwise  Shoulder flexion/extensio    ISOMETRIC EX:  Scapular depression : Bilateral or Left hand only  Scapular retraction: bilateral ext./bilateral rows    Shoulder impingement education and activity modification recommendations    Recommended massage of the supraspinatus tendon 2x day 3-5 min.  Recommended doorway and corner stretches when symptoms decrease to stretch the pec minor and major and thoracic spine stretches    One on one exercises: 28 MIN.  Manual therapy: 13 min.    Assessment  This 67 y.o. male referred to Outpatient Occupational Therapy with diagnosis of Left shoulder impringement presents with limitations as described in problem list. Pt.has mild range of motion and strength deficits and mild tenderness over the supraspinatus tendon.He has a trigger point in the LT upper trap and over uses his LT upper trap with UE functional activities Patient can benefit from Occupational Therapy services for Ultrasound, PROM, Theraputic exercises, home exercise program provied with written instructions, ice, strengthening, Theraband Ex, UBE and pulley ex in order to maximize painfree functional use of  left UE. . The following goals were discussed with the patient and he is in agreement with them as to be addressed in the treatment plan. Patient has good rehab potential.  Co-morbidities which may impact the plan of care and potentially impede patient's progress in therapy include: None noted.    Patient's clinical presentation is Evolving.     Complexity level is low.    Problem List:   Decreased function of Left UE, Decreased ROM, Increased pain and Decreased strength    Goals to be met in 4 weeks:  1) Pt will be independent and report performing HEP to maximize (R) shoulder functional capacity.  2) Pt  will increase shoulder PROM in all measured planes of motion by 10 degrees to A with daily task.  3) Pt will report use of home modalities for pain management.  4) Pt will report one degree less of pain with nonuse and with use.  5) Pt will report interrupted sleep no more than twice a night.    Plan  Vikrambha to be treated by Occupational Therapy 2 times per week for 6 weeks to achieve the established goals. (Patient is not interested in attending therapy 2x week)  Treatment to include Ultrasoud @ 3mHz, PROM Home program, Ice, Moist heat, Strengthening Theraband Ex and UBE  as well as any other treatments deemed necessary based on the patient's needs or progress.       INITIAL EVALUATION FUNCTIONAL LIMITATION DOCUMENTATION;    Patient reports that their primary goal for OT is to return to work or prior function  with minimal help. Their current impairment level is 0  percent impaired (8987GC) based on their Functional Upper Extremity Functional Scale score. They are expected to be achieve a score of  0 percent impaired (8988 GC) after 10 therapy visits.

## 2018-09-25 NOTE — PROGRESS NOTES
I CERTIFY THE NEED FOR THESE SERVICES FURNISHED UNDER THIS PLAN OF TREATMENT AND WHILE UNDER MY CARE.     Referring providers comments;              [facility] #: [id number]  Date of evaluation: 2018     Referring Physician: Troy Becerra Sr., MD  Diagnosis: Left shoulder shoulder pain  Referral Orders: Eval and Treat 3X WEEK for 90 days to include passive and active range of motion exercises.  Orders  on Dec.25, 2018.    Age: 67 y.o.  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    ergocalciferol (ERGOCALCIFEROL) 50,000 unit Cap TAKE 1 CAPSULE BY MOUTH EVERY 7 DAYS    esomeprazole (NEXIUM) 40 MG capsule Take 1 capsule (40 mg total) by mouth before breakfast.    fluticasone (FLONASE) 50 mcg/actuation nasal spray 2 sprays (100 mcg total) by Each Nare route once daily.    losartan (COZAAR) 50 MG tablet Take 1 tablet (50 mg total) by mouth once daily.    polyethylene glycol (GLYCOLAX) 17 gram/dose powder 1 tlbs in 8 ounces of water daily prn constipation    pravastatin (PRAVACHOL) 20 MG tablet Take 1 tablet (20 mg total) by mouth once daily.    tadalafil (CIALIS) 2.5 mg Tab Take 1 tablet by mouth once daily.    tamsulosin (FLOMAX) 0.4 mg Cap Take 1 capsule (0.4 mg total) by mouth once daily.     No current facility-administered medications for this visit.       Review of patient's allergies indicates:   Allergen Reactions    Nsaids (non-steroidal anti-inflammatory drug)      Jejunal erosions     X-Rays/Tests: None/ordered      Subjective: Pt.reports a 3 month HX of occasional LT shoulder pain without trauma. He is not reporting any pain at rest and only mild pain with activity.He is sedentary at home and at work and has not received therapy in the past.  THE UPPER EXTREMITY FUNCTIONAL SCALE (UEFS) - The following scores are based on patient reported assessment.      0 = extreme difficulty or unable to perform activity; 1 = quite a bit of difficulty; 2 = moderate difficulty; 3 = a little bit of difficulty; 4 = no difficulty    1 Any of your usual work, housework, or school activities     2 Your usual hobbies, re creational or sporting activities      3 Lifting a bag of groceries to waist level        4 Lifting a bag of groceries above your head       5 Grooming your hair           6 Pushing up on your hands (eg from bathtub or chair)      7 Preparing food (eg peeling, cutting)        8 Driving            9 Vacuuming, sweeping or raking         10 Dressing            11 Doing up buttons           12 Using tools or appliances          13 Opening doors           14 Cleaning            15 Tying or lacing shoes          16 Sleeping            17 Laundering clothes (eg washing, ironing, folding)      18 Opening a jar           19 Throwing a ball           20 Carrying a small suitcase with your affected limb        Minimum Level of Detectable Change (90% Confidence): 9 points SCORE: 80/80    Patient reports 1% ability on the Upper Extremity Functional Scale.      Pain:  During no work:0/10  While workin/10  Sleepin/10  Location of pain:    Eduardo's goals for therapy are: Strengthening of muscles.      Objective:  Sensation Test: Patient denies any numbness/tingling    Observation/Inspection   Left Right    Anatomic Symmetry normal normal   Bony normal normal   Suparspinatus normal normal   Infraspinatus normal normal   Rhomboid normal normal     Observation/Inspection:   scapular elevation with attempted shoulder ROM and rounded shoulders / forward head and normal muscle tone for age      Range of Motion:   Right: WNL  Left: limited as follows: (see measurements table below)     (L) UE (R) UE     AROM AROM Norm   Shoulder Flexion   0-180   Shoulder Flexion 170 175 180   Shoulder Abduction 165 175 0-180   Shoulder Extension 40 50 0-50   Shoulder Internal Rotation 80 90 0-90   Shoulder External Rotation 80 90 0-90   Horz Shoulder Adduction 40 40 0-40     ROM Comments:    Firm end feel and Pain at end range    Painful Arc:   Patient demonstrates no painful arc in shoulder flexion or abduction      Strength  Shoulder Flexion RUE: 5/5   Shoulder Extension RUE: 5/5   Shoulder Abduction RUE:  5/5   Shoulder Adduction RUE:  5/5   Internal Rotation RUE: 5/5   External Rotation RUE: 4+/5   Horizontal Adduction RUE: 5/5   Shoulder Flexion LUE: 4+/5   Shoulder Extension LUE: 4/5   Shoulder Abduction LUE: 4/5   Shoulder Abbduction LUE: 4+/5   Internal Rotation LUE:  4+/5   External Rotation LUE:  4/5   Horizontal Adduction LUE:  5/5   Supraspinatus: 4-/5    Pull Tests:  Abduction: NT  Ext. Rotation: NT    Special Tests:  Positive: Int. Rot. and ADD. Impingement  Negative: Apprehension and Speed's Test      Palpation: (for pain)     Positive: Supraspinatus Region/upper trap and levator scap     Negative: SC joint, Coracoid process, Lateral Subacromial Space, Posterior Subacromial Space, Infraspinatus Region and AC joint    Limitations of Functional Status:   Self Care: none reported  Work: none reported  Leisure: none repoprted    Treatment included: OT evaluation, the following exercises (HEP) were instructed and Eduardo was able to demonstrate them prior to the end of the session. HEP are as follows:   Ultrasound x8 min. Over  the anterior/lateral subacromial space 50% with FIR  STM of the LT upper trap and levator scap muscles    Upper trap and levator scap stretches: 3/30 sec.holds    Codman's Exercises:  Clockwise/counterclockwise  Shoulder flexion/extensio    ISOMETRIC EX:  Scapular depression : Bilateral or Left hand only  Scapular retraction: bilateral ext./bilateral rows    Shoulder impingement education and activity modification recommendations    Recommended massage of the supraspinatus tendon 2x day 3-5 min.  Recommended doorway and corner stretches when symptoms decrease to stretch the pec minor and major and thoracic spine stretches    One on one exercises: 28 MIN.  Manual therapy: 13 min.    Assessment  This 67 y.o. male referred to Outpatient Occupational Therapy with diagnosis of Left shoulder impringement presents with limitations as described in problem list. Pt.has mild range of motion and strength deficits and mild tenderness over the supraspinatus tendon.He has a trigger point in the LT upper trap and over uses his LT upper trap with UE functional activities Patient can benefit from Occupational Therapy services for Ultrasound, PROM, Theraputic exercises, home exercise program provied with written instructions, ice, strengthening, Theraband Ex, UBE and pulley ex in order to maximize painfree functional use of  left UE. . The following goals were discussed with the patient and he is in agreement with them as to be addressed in the treatment plan. Patient has good rehab potential.  Co-morbidities which may impact the plan of care and potentially impede patient's progress in therapy include: None noted.    Patient's clinical presentation is Evolving.     Complexity level is low.    Problem List:   Decreased function of Left UE, Decreased ROM, Increased pain and Decreased strength    Goals to be met in 4 weeks:  1) Pt will be independent and report performing HEP to maximize (R) shoulder functional capacity.  2) Pt will  increase shoulder PROM in all measured planes of motion by 10 degrees to A with daily task.  3) Pt will report use of home modalities for pain management.  4) Pt will report one degree less of pain with nonuse and with use.  5) Pt will report interrupted sleep no more than twice a night.    Plan  Vikrambha to be treated by Occupational Therapy 2 times per week for 6 weeks to achieve the established goals. (Patient is not interested in attending therapy 2x week)  Treatment to include Ultrasoud @ 3mHz, PROM Home program, Ice, Moist heat, Strengthening Theraband Ex and UBE  as well as any other treatments deemed necessary based on the patient's needs or progress.       INITIAL EVALUATION FUNCTIONAL LIMITATION DOCUMENTATION;    Patient reports that their primary goal for OT is to return to work or prior function  with minimal help. Their current impairment level is 0  percent impaired (8987GC) based on their Functional Upper Extremity Functional Scale score. They are expected to be achieve a score of  0 percent impaired (8988 GC) after 10 therapy visits.

## 2018-10-04 ENCOUNTER — CLINICAL SUPPORT (OUTPATIENT)
Dept: REHABILITATION | Facility: HOSPITAL | Age: 68
End: 2018-10-04
Attending: ORTHOPAEDIC SURGERY
Payer: MEDICARE

## 2018-10-04 DIAGNOSIS — M25.512 ACUTE PAIN OF LEFT SHOULDER: Primary | ICD-10-CM

## 2018-10-04 PROCEDURE — 97140 MANUAL THERAPY 1/> REGIONS: CPT | Mod: PO | Performed by: OCCUPATIONAL THERAPIST

## 2018-10-04 PROCEDURE — 97035 APP MDLTY 1+ULTRASOUND EA 15: CPT | Mod: PO | Performed by: OCCUPATIONAL THERAPIST

## 2018-10-04 PROCEDURE — 97110 THERAPEUTIC EXERCISES: CPT | Mod: PO | Performed by: OCCUPATIONAL THERAPIST

## 2018-10-04 NOTE — PROGRESS NOTES
Patient: Eduardo [facility] #: [id number]  Date of evaluation: 10/04/2018     Referring Physician: Troy Becerra Sr., MD  Diagnosis: Left shoulder shoulder pain  Referral Orders: Eval and Treat 3X WEEK for 90 days to include passive and active range of motion exercises.  Orders  on Dec.25, 2018.    Time In: 7:05  Time Out: 8:04    X-Rays/Tests: None/ordered      Subjective: Pt.reports a 3 month HX of occasional LT shoulder pain without trauma. He is not reporting any pain at rest and only mild pain with activity.He is sedentary at home and at work and has not received therapy in the past.  THE UPPER EXTREMITY FUNCTIONAL SCALE (UEFS) - The following scores are based on patient reported assessment.      0 = extreme difficulty or unable to perform activity; 1 = quite a bit of difficulty; 2 = moderate difficulty; 3 = a little bit of difficulty; 4 = no difficulty    1 Any of your usual work, housework, or school activities   4  2 Your usual hobbies, re creational or sporting activities      3 Lifting a bag of groceries to waist level        4 Lifting a bag of groceries above your head       5 Grooming your hair           6 Pushing up on your hands (eg from bathtub or chair)      7 Preparing food (eg peeling, cutting)        8 Driving            9 Vacuuming, sweeping or raking         10 Dressing            11 Doing up buttons           12 Using tools or appliances          13 Opening doors           14 Cleaning            15 Tying or lacing shoes          16 Sleeping            17 Laundering clothes (eg washing, ironing, folding)      18 Opening a jar           19 Throwing a ball           20 Carrying a small suitcase with your affected limb        Minimum Level of Detectable Change (90% Confidence): 9 points SCORE: 80/80    Patient reports 1% ability on the Upper Extremity Functional Scale.      Eduardo's goals for therapy are: Strengthening of  muscles.      Objective:    Observation/Inspection:   scapular elevation with attempted shoulder ROM and rounded shoulders / forward head and normal muscle tone for age      Range of Motion:   Right: WNL  Left: limited as follows: (see measurements table below)     (L) UE (R) UE     AROM AROM Norm   Shoulder Flexion   0-180   Shoulder Flexion 170 175 180   Shoulder Abduction 165 175 0-180   Shoulder Extension 40 50 0-50   Shoulder Internal Rotation 80 90 0-90   Shoulder External Rotation 80 90 0-90   Horz Shoulder Adduction 40 40 0-40     ROM Comments:    Firm end feel and Pain at end range    Painful Arc:   Patient demonstrates no painful arc in shoulder flexion or abduction      Strength  Shoulder Flexion RUE: 5/5   Shoulder Extension RUE: 5/5   Shoulder Abduction RUE:  5/5   Shoulder Adduction RUE:  5/5   Internal Rotation RUE: 5/5   External Rotation RUE: 4+/5   Horizontal Adduction RUE: 5/5   Shoulder Flexion LUE: 4+/5   Shoulder Extension LUE: 4/5   Shoulder Abduction LUE: 4/5   Shoulder Abbduction LUE: 4+/5   Internal Rotation LUE:  4+/5   External Rotation LUE:  4/5   Horizontal Adduction LUE:  5/5   Supraspinatus: 4-/5    Pull Tests:  Abduction: NT  Ext. Rotation: NT    Special Tests:  Positive: Int. Rot. and ADD. Impingement  Negative: Apprehension and Speed's Test      Palpation: (for pain)     Positive: Upper trap and levator scap      Treatment included:   Ultrasound x8 min. Over the anterior/lateral subacromial space 50% with FIR  STM of the LT upper trap and levator scap muscles/FM of the LT supraspinatus tendon    Upper trap and levator scap stretches: 3/30 sec.holds  Pec minor stretches 3/30 sec.holds    Codman's Exercises:  Clockwise/counterclockwise  Shoulder flexion/extensio    ISOMETRIC EX:  Scapular depression : Bilateral or Left hand only  Scapular retraction: bilateral ext./bilateral rows  Bilateral external rotation    Shoulder impingement education and activity modification  recommendations    Recommended massage of the supraspinatus tendon 2x day 3-5 min.  Recommended doorway and corner stretches when symptoms decrease to stretch the pec minor and major and thoracic spine stretches    One on one exercises: 27 MIN.  Manual therapy: 13 min.  Ice pack: 15 min. LT shoulder    Assessment  Patient has end range anterior deltoid pain with shoulder flexion and shoulder external rotation stretches.No pain noted today with palpation of the supraspinatus tendon.Pt.continues to initiate with bilateral upper traps with exercises.Pt.is benefiting from therapy.    Problem List:   Decreased function of Left UE, Decreased ROM, Increased pain and Decreased strength    Goals to be met in 4 weeks:  1) Pt will be independent and report performing HEP to maximize (R) shoulder functional capacity.  2) Pt will increase shoulder PROM in all measured planes of motion by 10 degrees to A with daily task.  3) Pt will report use of home modalities for pain management.  4) Pt will report one degree less of pain with nonuse and with use.  5) Pt will report interrupted sleep no more than twice a night.    Plan  Denha to be treated by Occupational Therapy 2 times per week for 6 weeks to achieve the established goals. (Patient is not interested in attending therapy 2x week)  Treatment to include Ultrasoud @ 3mHz, PROM Home program, Ice, Moist heat, Strengthening Theraband Ex and UBE  as well as any other treatments deemed necessary based on the patient's needs or progress.

## 2018-10-09 ENCOUNTER — CLINICAL SUPPORT (OUTPATIENT)
Dept: REHABILITATION | Facility: HOSPITAL | Age: 68
End: 2018-10-09
Attending: ORTHOPAEDIC SURGERY
Payer: MEDICARE

## 2018-10-09 DIAGNOSIS — M25.512 ACUTE PAIN OF LEFT SHOULDER: Primary | ICD-10-CM

## 2018-10-09 PROCEDURE — 97110 THERAPEUTIC EXERCISES: CPT | Mod: PO | Performed by: OCCUPATIONAL THERAPIST

## 2018-10-09 PROCEDURE — 97035 APP MDLTY 1+ULTRASOUND EA 15: CPT | Mod: PO | Performed by: OCCUPATIONAL THERAPIST

## 2018-10-09 PROCEDURE — 97014 ELECTRIC STIMULATION THERAPY: CPT | Mod: PO | Performed by: OCCUPATIONAL THERAPIST

## 2018-10-09 NOTE — PROGRESS NOTES
Patient: Eduardo [facility] #: [id number]  Date of evaluation: 10/09/2018     Referring Physician: Troy Becerra Sr., MD  Diagnosis: Left shoulder shoulder pain  Referral Orders: Eval and Treat 3X WEEK for 90 days to include passive and active range of motion exercises.  Orders  on Dec.25, 2018.    Time In: 7:05  Time Out: 8:18    X-Rays/Tests: None/ordered    Subjective: Pt.reports that he is feeling better and feels that his main problem what his LT shoulder muscle spasm.He has not been performing his shoulder stretches per his report.    THE UPPER EXTREMITY FUNCTIONAL SCALE (UEFS) - The following scores are based on patient reported assessment.      0 = extreme difficulty or unable to perform activity; 1 = quite a bit of difficulty; 2 = moderate difficulty; 3 = a little bit of difficulty; 4 = no difficulty    1 Any of your usual work, housework, or school activities   4  2 Your usual hobbies, re creational or sporting activities      3 Lifting a bag of groceries to waist level        4 Lifting a bag of groceries above your head       5 Grooming your hair           6 Pushing up on your hands (eg from bathtub or chair)      7 Preparing food (eg peeling, cutting)        8 Driving            9 Vacuuming, sweeping or raking         10 Dressing            11 Doing up buttons           12 Using tools or appliances          13 Opening doors           14 Cleaning            15 Tying or lacing shoes          16 Sleeping            17 Laundering clothes (eg washing, ironing, folding)      18 Opening a jar           19 Throwing a ball           20 Carrying a small suitcase with your affected limb        Minimum Level of Detectable Change (90% Confidence): 9 points SCORE: 80/80    Patient reports 1% ability on the Upper Extremity Functional Scale.      Eduardo's goals for therapy are: Strengthening of  muscles.      Objective:    Observation/Inspection:   scapular elevation with attempted shoulder ROM and rounded shoulders / forward head and normal muscle tone for age      Range of Motion:   Right: WNL  Left: limited as follows: (see measurements table below)     (L) UE (R) UE     AROM AROM Norm   Shoulder Flexion   0-180   Shoulder Flexion 170 175 180   Shoulder Abduction 165 175 0-180   Shoulder Extension 40 50 0-50   Shoulder Internal Rotation 80 90 0-90   Shoulder External Rotation 80 90 0-90   Horz Shoulder Adduction 40 40 0-40     ROM Comments:    Firm end feel and Pain at end range    Painful Arc:   Patient demonstrates no painful arc in shoulder flexion or abduction      Strength  Shoulder Flexion RUE: 5/5   Shoulder Extension RUE: 5/5   Shoulder Abduction RUE:  5/5   Shoulder Adduction RUE:  5/5   Internal Rotation RUE: 5/5   External Rotation RUE: 4+/5   Horizontal Adduction RUE: 5/5   Shoulder Flexion LUE: 4+/5   Shoulder Extension LUE: 4/5   Shoulder Abduction LUE: 4/5   Shoulder Abbduction LUE: 4+/5   Internal Rotation LUE:  4+/5   External Rotation LUE:  4/5   Horizontal Adduction LUE:  5/5   Supraspinatus: 4-/5    Pull Tests:  Abduction: NT  Ext. Rotation: NT    Special Tests:  Positive: Int. Rot. and ADD. Impingement  Negative: Apprehension and Speed's Test      Palpation: (for pain)     Positive: Upper trap and levator scap      Treatment included:   Ultrasound x8 min. Over the anterior/lateral subacromial space 50% with FIR  STM of the LT upper trap and levator scap muscles/FM of the LT supraspinatus tendon    Upper trap and levator scap stretches: 3/30 sec.holds  Pec minor stretches 3/30 sec.holds    Codman's Exercises:  Clockwise/counterclockwise  Shoulder flexion/extensio    ISOMETRIC EX: Yellow TB  Scapular depression : Left hand   Scapular retraction: bilateral ext./bilateral rows  Bilateral external rotation    Shoulder impingement education and activity modification  recommendations    Recommended massage of the supraspinatus tendon 2x day 3-5 min.  Recommended doorway and corner stretches when symptoms decrease to stretch the pec minor and major and thoracic spine stretches    One on one exercises: 27 MIN.  Manual therapy: 13 min.  Ice pack: 15 min. LT shoulder    Assessment  Patient has reports of decreased pain today and is benefiting from therapy. He has no tenderness of the supraspinatus with palpation.    Problem List:   Decreased function of Left UE, Decreased ROM, Increased pain and Decreased strength    Goals to be met in 4 weeks:  1) Pt will be independent and report performing HEP to maximize (R) shoulder functional capacity.  2) Pt will increase shoulder PROM in all measured planes of motion by 10 degrees to A with daily task.  3) Pt will report use of home modalities for pain management.  4) Pt will report one degree less of pain with nonuse and with use.  5) Pt will report interrupted sleep no more than twice a night.    Plan  Vikrambha to be treated by Occupational Therapy 2 times per week for 6 weeks to achieve the established goals. (Patient is not interested in attending therapy 2x week)  Treatment to include Ultrasoud @ 3mHz, PROM Home program, Ice, Moist heat, Strengthening Theraband Ex and UBE  as well as any other treatments deemed necessary based on the patient's needs or progress.

## 2018-10-24 ENCOUNTER — CLINICAL SUPPORT (OUTPATIENT)
Dept: REHABILITATION | Facility: HOSPITAL | Age: 68
End: 2018-10-24
Attending: ORTHOPAEDIC SURGERY
Payer: MEDICARE

## 2018-10-24 DIAGNOSIS — M25.512 ACUTE PAIN OF LEFT SHOULDER: Primary | ICD-10-CM

## 2018-10-24 PROCEDURE — 97110 THERAPEUTIC EXERCISES: CPT | Mod: PO | Performed by: OCCUPATIONAL THERAPIST

## 2018-10-24 PROCEDURE — 97140 MANUAL THERAPY 1/> REGIONS: CPT | Mod: PO | Performed by: OCCUPATIONAL THERAPIST

## 2018-10-24 NOTE — PROGRESS NOTES
Patient: Eduardo [facility] #: [id number]  Date of evaluation: 10/24/2018     Referring Physician: Troy Becerra Sr., MD  Diagnosis: Left shoulder shoulder pain  DX Code:M25.512 (ICD-10-CM) - Left shoulder pain, unspecified chronicity    Referral Orders: Eval and Treat 3X WEEK for 90 days to include passive and active range of motion exercises.  Orders  on Dec.25, 2018.    Time In: 7:01  Time Out: 8:18    X-Rays/Tests: None/ordered    Subjective: Pt.reports that he has slight pain in his RT upper arm with stretching exercises.    THE UPPER EXTREMITY FUNCTIONAL SCALE (UEFS) - The following scores are based on patient reported assessment.      0 = extreme difficulty or unable to perform activity; 1 = quite a bit of difficulty; 2 = moderate difficulty; 3 = a little bit of difficulty; 4 = no difficulty    1 Any of your usual work, housework, or school activities     2 Your usual hobbies, re creational or sporting activities      3 Lifting a bag of groceries to waist level        4 Lifting a bag of groceries above your head       5 Grooming your hair           6 Pushing up on your hands (eg from bathtub or chair)      7 Preparing food (eg peeling, cutting)        8 Driving            9 Vacuuming, sweeping or raking         10 Dressing            11 Doing up buttons           12 Using tools or appliances          13 Opening doors           14 Cleaning            15 Tying or lacing shoes          16 Sleeping            17 Laundering clothes (eg washing, ironing, folding)      18 Opening a jar           19 Throwing a ball           20 Carrying a small suitcase with your affected limb        Minimum Level of Detectable Change (90% Confidence): 9 points SCORE: 80/80    Patient reports 1% ability on the Upper Extremity Functional Scale.      Eduardo's goals for therapy are: Strengthening of muscles.      Objective:    Observation/Inspection:    scapular elevation with attempted shoulder ROM and rounded shoulders / forward head and normal muscle tone for age      Range of Motion:   Right: WNL  Left: limited as follows: (see measurements table below)     (L) UE (R) UE     AROM AROM Norm   Shoulder Flexion   0-180   Shoulder Flexion 170 175 180   Shoulder Abduction 165 175 0-180   Shoulder Extension 40 50 0-50   Shoulder Internal Rotation 80 90 0-90   Shoulder External Rotation 80 90 0-90   Horz Shoulder Adduction 40 40 0-40     ROM Comments:    Firm end feel and Pain at end range    Strength  Shoulder Flexion RUE: 5/5   Shoulder Extension RUE: 5/5   Shoulder Abduction RUE:  5/5   Shoulder Adduction RUE:  5/5   Internal Rotation RUE: 5/5   External Rotation RUE: 4+/5   Horizontal Adduction RUE: 5/5   Shoulder Flexion LUE: 4+/5   Shoulder Extension LUE: 4/5   Shoulder Abduction LUE: 4/5   Shoulder Abbduction LUE: 4+/5   Internal Rotation LUE:  4+/5   External Rotation LUE:  4/5   Horizontal Adduction LUE:  5/5   Supraspinatus: 4-/5    Special Tests:  Negative: Int. Rot. and ADD. Impingement        Palpation: (for pain)     Positive: Upper trap and levator scap and infraspinatus tendon      Treatment included:   Ultrasound x8 min. Over the anterior/lateral subacromial space 50% with FIR  Moist heat LT shoulder x10 min.  STM of the LT upper trap and levator scap muscles/FM of the LT infraspinatus tendon    Upper trap and levator scap stretches: 3/30 sec.holds  Pec minor stretches 3/30 sec.holds  Thoracic spine stretches: 3/30 sec.holds    Boris exercises:  3 minutes each for stretching  Shoulder flexion  Shoulder abduction    ISOMETRIC EX: Yellow TB  Scapular depression : Left hand 30# pulley  Scapular retraction: bilateral ext./bilateral rows  Bilateral external rotation    Isotonic exercises: 10-20 reps  IR/ER yellow TB    Kinesio taping bilateral upper I strip with Y tail to inhibit muscle contraction with handout for doffing,wearing time and skin  precautions.    Shoulder impingement education and activity modification recommendations    Recommended massage of the supraspinatus tendon 2x day 3-5 min.  Recommended doorway and corner stretches when symptoms decrease to stretch the pec minor and major and thoracic spine stretches    One on one exercises: 43 MIN.  Manual therapy: 12 min.  Ice pack: 15 min. LT shoulder Not performed today    Assessment  Patient has decreased supraspinatus and infraspinatus strength and tenderness with palpation of the infraspinatus tendon. Pt.continues to initiate movement with his bilateral upper trap muscle.    Problem List:   Decreased function of Left UE, Decreased ROM, Increased pain and Decreased strength    Goals to be met in 4 weeks:  1) Pt will be independent and report performing HEP to maximize (R) shoulder functional capacity.  2) Pt will increase shoulder PROM in all measured planes of motion by 10 degrees to A with daily task.  3) Pt will report use of home modalities for pain management.  4) Pt will report one degree less of pain with nonuse and with use.  5) Pt will report interrupted sleep no more than twice a night. Met 10/24/2018    Plan  Eduardo to be treated by Occupational Therapy 2 times per week for 6 weeks to achieve the established goals. (Patient is not interested in attending therapy 2x week)  Treatment to include Ultrasoud @ 3mHz, PROM Home program, Ice, Moist heat, Strengthening Theraband Ex and UBE  as well as any other treatments deemed necessary based on the patient's needs or progress.

## 2018-10-31 ENCOUNTER — CLINICAL SUPPORT (OUTPATIENT)
Dept: REHABILITATION | Facility: HOSPITAL | Age: 68
End: 2018-10-31
Attending: ORTHOPAEDIC SURGERY
Payer: MEDICARE

## 2018-10-31 DIAGNOSIS — M25.512 ACUTE PAIN OF LEFT SHOULDER: Primary | ICD-10-CM

## 2018-10-31 PROCEDURE — 97035 APP MDLTY 1+ULTRASOUND EA 15: CPT | Mod: PO | Performed by: OCCUPATIONAL THERAPIST

## 2018-10-31 PROCEDURE — 97110 THERAPEUTIC EXERCISES: CPT | Mod: PO | Performed by: OCCUPATIONAL THERAPIST

## 2018-10-31 PROCEDURE — 97014 ELECTRIC STIMULATION THERAPY: CPT | Mod: PO | Performed by: OCCUPATIONAL THERAPIST

## 2018-10-31 NOTE — PROGRESS NOTES
Visit #5    Patient: Eduardo Krishnamurthy   [facility] #: [id number]  Date of evaluation: 10/31/2018     Referring Physician: Troy Becerra Sr., MD  Diagnosis: Left shoulder shoulder pain  DX Code:M25.512 (ICD-10-CM) - Left shoulder pain, unspecified chronicity    Referral Orders: Eval and Treat 3X WEEK for 90 days to include passive and active range of motion exercises.  Orders  on Dec.25, 2018.    Time In: 7:01  Time Out: 8:18    X-Rays/Tests: None/ordered    Subjective: Pt.reports LT lateral deltoid pain in the AM.and slight pain today. Pt.is not stretching on a regular basis.    THE UPPER EXTREMITY FUNCTIONAL SCALE (UEFS) - The following scores are based on patient reported assessment.      0 = extreme difficulty or unable to perform activity; 1 = quite a bit of difficulty; 2 = moderate difficulty; 3 = a little bit of difficulty; 4 = no difficulty    1 Any of your usual work, housework, or school activities     2 Your usual hobbies, re creational or sporting activities      3 Lifting a bag of groceries to waist level        4 Lifting a bag of groceries above your head       5 Grooming your hair           6 Pushing up on your hands (eg from bathtub or chair)      7 Preparing food (eg peeling, cutting)        8 Driving            9 Vacuuming, sweeping or raking         10 Dressing            11 Doing up buttons           12 Using tools or appliances          13 Opening doors           14 Cleaning            15 Tying or lacing shoes          16 Sleeping            17 Laundering clothes (eg washing, ironing, folding)      18 Opening a jar           19 Throwing a ball           20 Carrying a small suitcase with your affected limb        Minimum Level of Detectable Change (90% Confidence): 9 points SCORE: 80/80    Patient reports 1% ability on the Upper Extremity Functional Scale.      Janicebosathishbrendan's goals for therapy are: Strengthening of  muscles.      Objective:    Observation/Inspection:   scapular elevation with attempted shoulder ROM and rounded shoulders / forward head and normal muscle tone for age      Range of Motion:   Right: WNL  Left: limited as follows: (see measurements table below)     (L) UE (R) UE     AROM AROM Norm   Shoulder Flexion   0-180   Shoulder Flexion 170 175 180   Shoulder Abduction 165 175 0-180   Shoulder Extension 40 50 0-50   Shoulder Internal Rotation 80 90 0-90   Shoulder External Rotation 80 90 0-90   Horz Shoulder Adduction 40 40 0-40     ROM Comments:    Firm end feel and Pain at end range    Strength  Shoulder Flexion RUE: 5/5   Shoulder Extension RUE: 5/5   Shoulder Abduction RUE:  5/5   Shoulder Adduction RUE:  5/5   Internal Rotation RUE: 5/5   External Rotation RUE: 4+/5   Horizontal Adduction RUE: 5/5   Shoulder Flexion LUE: 4+/5   Shoulder Extension LUE: 4/5   Shoulder Abduction LUE: 4/5   Shoulder Abbduction LUE: 4+/5   Internal Rotation LUE:  4+/5   External Rotation LUE:  4/5   Horizontal Adduction LUE:  5/5   Supraspinatus: 4-/5    Special Tests:  Negative: Int. Rot. and ADD. Impingement        Palpation: (for pain)     Positive: Upper trap and levator scap and infraspinatus tendon      Treatment included:   Ultrasound x8 min. Over the posterior/lateral subacromial space 50%     STM of the LT upper trap and levator scap muscles/FM of the LT infraspinatus tendon    Pec minor stretches 3/30 sec.holds  Thoracic spine stretches: 3/30 sec.holds  FIR stretch 3/30 sec.holds    Boris exercises:  3 minutes each for stretching  Shoulder flexion  Shoulder abduction    Reviewed wall stretches in flexion and abduction and supine shoulder flexion and external rotation stretches.    ISOMETRIC EX: Yellow TB  Bilateral external rotation  IR/ER  Flexion/Extension    PROM:  Shoulder flexion  ER/IR    Isotonic exercises: 10-20 reps  Rows: red TB  Scapular retraction: bilateral ext    Shoulder impingement education and  activity modification recommendations    Recommended massage of the Infraspinatus tendon 2x day 3-5 min.  Recommended doorway and corner stretches when symptoms decrease to stretch the pec minor and major and thoracic spine stretches    One on one exercises: 35 MIN.    Ice pack: 15 min. LT shoulder Not performed today    Assessment  Patient has tenderness over the infraspinatus tendon and Pain with end range flexion/abduction and external rotation.Recommended stretching 3x day as tolerated.    Problem List:   Decreased function of Left UE, Decreased ROM, Increased pain and Decreased strength    Goals to be met in 4 weeks:  1) Pt will be independent and report performing HEP to maximize (R) shoulder functional capacity.  2) Pt will increase shoulder PROM in all measured planes of motion by 10 degrees to A with daily task.  3) Pt will report use of home modalities for pain management.  4) Pt will report one degree less of pain with nonuse and with use. Met 10/31/2018  5) Pt will report interrupted sleep no more than twice a night. Met 10/24/2018  6) Pt will demonstrate decreased pain with end range flexion/abduction and ER.  7) Pt will report decreased infraspinatus tenderness with palpation.  8) Pt will demonstrate improvement by tolerating yellow TB IR/ER exercises.    Plan  Vikrambha to be treated by Occupational Therapy 2 times per week for 6 weeks to achieve the established goals. (Patient is not interested in attending therapy 2x week)  Treatment to include Ultrasoud @ 3mHz, PROM Home program, Ice, Moist heat, Strengthening Theraband Ex and UBE  as well as any other treatments deemed necessary based on the patient's needs or progress.

## 2018-12-10 ENCOUNTER — LAB VISIT (OUTPATIENT)
Dept: LAB | Facility: HOSPITAL | Age: 68
End: 2018-12-10
Attending: FAMILY MEDICINE
Payer: MEDICARE

## 2018-12-10 DIAGNOSIS — E55.9 VITAMIN D DEFICIENCY: ICD-10-CM

## 2018-12-10 DIAGNOSIS — D50.9 IRON DEFICIENCY ANEMIA, UNSPECIFIED IRON DEFICIENCY ANEMIA TYPE: ICD-10-CM

## 2018-12-10 LAB
25(OH)D3+25(OH)D2 SERPL-MCNC: 35 NG/ML
BASOPHILS # BLD AUTO: 0.06 K/UL
BASOPHILS NFR BLD: 1.1 %
DIFFERENTIAL METHOD: ABNORMAL
EOSINOPHIL # BLD AUTO: 0.1 K/UL
EOSINOPHIL NFR BLD: 2.5 %
ERYTHROCYTE [DISTWIDTH] IN BLOOD BY AUTOMATED COUNT: 14.6 %
FERRITIN SERPL-MCNC: 12 NG/ML
HCT VFR BLD AUTO: 41.9 %
HGB BLD-MCNC: 12.6 G/DL
IMM GRANULOCYTES # BLD AUTO: 0.01 K/UL
IMM GRANULOCYTES NFR BLD AUTO: 0.2 %
LYMPHOCYTES # BLD AUTO: 2.3 K/UL
LYMPHOCYTES NFR BLD: 43.5 %
MCH RBC QN AUTO: 24.1 PG
MCHC RBC AUTO-ENTMCNC: 30.1 G/DL
MCV RBC AUTO: 80 FL
MONOCYTES # BLD AUTO: 0.5 K/UL
MONOCYTES NFR BLD: 10.2 %
NEUTROPHILS # BLD AUTO: 2.2 K/UL
NEUTROPHILS NFR BLD: 42.5 %
NRBC BLD-RTO: 0 /100 WBC
PLATELET # BLD AUTO: 207 K/UL
PMV BLD AUTO: 11.7 FL
RBC # BLD AUTO: 5.22 M/UL
WBC # BLD AUTO: 5.22 K/UL

## 2018-12-10 PROCEDURE — 85025 COMPLETE CBC W/AUTO DIFF WBC: CPT

## 2018-12-10 PROCEDURE — 82306 VITAMIN D 25 HYDROXY: CPT

## 2018-12-10 PROCEDURE — 82728 ASSAY OF FERRITIN: CPT

## 2018-12-10 PROCEDURE — 36415 COLL VENOUS BLD VENIPUNCTURE: CPT | Mod: PO

## 2018-12-11 ENCOUNTER — TELEPHONE (OUTPATIENT)
Dept: INTERNAL MEDICINE | Facility: CLINIC | Age: 68
End: 2018-12-11

## 2018-12-11 DIAGNOSIS — D64.9 ANEMIA, UNSPECIFIED TYPE: Primary | ICD-10-CM

## 2018-12-11 RX ORDER — FERROUS GLUCONATE 324(38)MG
324 TABLET ORAL
Qty: 30 TABLET | Refills: 2 | Status: SHIPPED | OUTPATIENT
Start: 2018-12-11 | End: 2019-03-25 | Stop reason: SDUPTHER

## 2018-12-11 NOTE — TELEPHONE ENCOUNTER
----- Message from Shreyas Duval MD sent at 12/11/2018  7:34 AM CST -----  Please notify iron level still bit low  Please resume iron (erxd) daily few months  Aster ferritin, cbc 6 months

## 2019-03-14 RX ORDER — MOMETASONE FUROATE 50 UG/1
SPRAY, METERED NASAL
Qty: 51 G | Refills: 3 | Status: SHIPPED | OUTPATIENT
Start: 2019-03-14 | End: 2019-08-15 | Stop reason: SDUPTHER

## 2019-03-25 RX ORDER — FERROUS GLUCONATE 324(38)MG
TABLET ORAL
Qty: 30 TABLET | Refills: 11 | Status: SHIPPED | OUTPATIENT
Start: 2019-03-25 | End: 2019-09-12

## 2019-06-21 ENCOUNTER — PES CALL (OUTPATIENT)
Dept: ADMINISTRATIVE | Facility: CLINIC | Age: 69
End: 2019-06-21

## 2019-07-09 ENCOUNTER — PATIENT MESSAGE (OUTPATIENT)
Dept: INTERNAL MEDICINE | Facility: CLINIC | Age: 69
End: 2019-07-09

## 2019-08-15 ENCOUNTER — OFFICE VISIT (OUTPATIENT)
Dept: INTERNAL MEDICINE | Facility: CLINIC | Age: 69
End: 2019-08-15
Payer: MEDICARE

## 2019-08-15 ENCOUNTER — HOSPITAL ENCOUNTER (OUTPATIENT)
Dept: RADIOLOGY | Facility: HOSPITAL | Age: 69
Discharge: HOME | End: 2019-08-15
Attending: FAMILY MEDICINE
Payer: MEDICARE

## 2019-08-15 VITALS
DIASTOLIC BLOOD PRESSURE: 70 MMHG | RESPIRATION RATE: 18 BRPM | WEIGHT: 190.06 LBS | TEMPERATURE: 98 F | HEART RATE: 70 BPM | BODY MASS INDEX: 28.15 KG/M2 | HEIGHT: 69 IN | SYSTOLIC BLOOD PRESSURE: 112 MMHG

## 2019-08-15 DIAGNOSIS — R30.0 DYSURIA: ICD-10-CM

## 2019-08-15 DIAGNOSIS — R05.3 CHRONIC COUGH: ICD-10-CM

## 2019-08-15 DIAGNOSIS — D50.8 IRON DEFICIENCY ANEMIA SECONDARY TO INADEQUATE DIETARY IRON INTAKE: Chronic | ICD-10-CM

## 2019-08-15 DIAGNOSIS — I10 ESSENTIAL HYPERTENSION: Primary | Chronic | ICD-10-CM

## 2019-08-15 DIAGNOSIS — K21.9 GASTROESOPHAGEAL REFLUX DISEASE WITHOUT ESOPHAGITIS: ICD-10-CM

## 2019-08-15 DIAGNOSIS — E55.9 VITAMIN D DEFICIENCY: ICD-10-CM

## 2019-08-15 DIAGNOSIS — Z12.5 SCREENING FOR PROSTATE CANCER: ICD-10-CM

## 2019-08-15 PROCEDURE — 99215 OFFICE O/P EST HI 40 MIN: CPT | Mod: S$PBB,,, | Performed by: FAMILY MEDICINE

## 2019-08-15 PROCEDURE — 71046 XR CHEST PA AND LATERAL: ICD-10-PCS | Mod: 26,,, | Performed by: RADIOLOGY

## 2019-08-15 PROCEDURE — 99999 PR PBB SHADOW E&M-EST. PATIENT-LVL IV: CPT | Mod: PBBFAC,,, | Performed by: FAMILY MEDICINE

## 2019-08-15 PROCEDURE — 99214 OFFICE O/P EST MOD 30 MIN: CPT | Mod: PBBFAC,25 | Performed by: FAMILY MEDICINE

## 2019-08-15 PROCEDURE — 71046 X-RAY EXAM CHEST 2 VIEWS: CPT | Mod: TC

## 2019-08-15 PROCEDURE — 99215 PR OFFICE/OUTPT VISIT, EST, LEVL V, 40-54 MIN: ICD-10-PCS | Mod: S$PBB,,, | Performed by: FAMILY MEDICINE

## 2019-08-15 PROCEDURE — 99999 PR PBB SHADOW E&M-EST. PATIENT-LVL IV: ICD-10-PCS | Mod: PBBFAC,,, | Performed by: FAMILY MEDICINE

## 2019-08-15 PROCEDURE — 71046 X-RAY EXAM CHEST 2 VIEWS: CPT | Mod: 26,,, | Performed by: RADIOLOGY

## 2019-08-15 RX ORDER — LOSARTAN POTASSIUM 50 MG/1
50 TABLET ORAL DAILY
Qty: 90 TABLET | Refills: 3 | Status: SHIPPED | OUTPATIENT
Start: 2019-08-15 | End: 2020-08-17 | Stop reason: SDUPTHER

## 2019-08-15 RX ORDER — DILTIAZEM HYDROCHLORIDE 240 MG/1
240 CAPSULE, EXTENDED RELEASE ORAL NIGHTLY
Qty: 90 CAPSULE | Refills: 3 | Status: SHIPPED | OUTPATIENT
Start: 2019-08-15 | End: 2020-08-17 | Stop reason: SDUPTHER

## 2019-08-15 RX ORDER — ERGOCALCIFEROL 1.25 MG/1
50000 CAPSULE ORAL
Qty: 12 CAPSULE | Refills: 0 | Status: SHIPPED | OUTPATIENT
Start: 2019-08-15 | End: 2020-08-17

## 2019-08-15 RX ORDER — MOMETASONE FUROATE 50 UG/1
SPRAY, METERED NASAL
Qty: 3 EACH | Refills: 3 | Status: SHIPPED | OUTPATIENT
Start: 2019-08-15 | End: 2020-08-17 | Stop reason: SDUPTHER

## 2019-08-15 RX ORDER — ESOMEPRAZOLE MAGNESIUM 40 MG/1
40 CAPSULE, DELAYED RELEASE ORAL
Qty: 90 CAPSULE | Refills: 3 | Status: SHIPPED | OUTPATIENT
Start: 2019-08-15 | End: 2020-08-17 | Stop reason: SDUPTHER

## 2019-08-15 NOTE — PROGRESS NOTES
Subjective:       Patient ID: Eduardo Krishnamurthy is a . male.    Chief Complaint:Multiple issues see below      HPI Hypertension: blood pressures at home normal. Tolerating medicine.   Iron def anemia sec jejunal erosions  nsaids ;;; not on iron daily  GERD asympt. Tolerating medication. On daily nxium   All rhin: infreq nasonex and rare zyrtec  Vit d defic: weekly vit d ;wants to do otc. On daily    Bph: flomax helped but still 4-5 x night; otw sleep ok; occas dysuria    3-4 months dry cough. Am pm mostly and when lying down;no fever night sweats          Review of Systems  Cardiovascular: no chest pain  Chest: no shortness of breath  Abd: no abd pain  Remainder review of systems negative    Objective:      Physical Exam   Constitutional: He is oriented to person, place, and time. He appears well-developed and well-nourished.   HENT:   Head: Normocephalic and atraumatic.   Right Ear: External ear normal.   Left Ear: External ear normal.   Nose: Nose normal.   Mouth/Throat: Oropharynx is clear and moist.   Nl tms   Eyes: Conjunctivae and EOM are normal. Pupils are equal, round, and reactive to light. No scleral icterus.   Neck: Normal range of motion. Neck supple. Carotid bruit is not present.   Cardiovascular: Normal rate, regular rhythm and normal heart sounds.  Exam reveals no gallop and no friction rub.    No murmur heard.  Pulmonary/Chest: Effort normal and breath sounds normal. He has no wheezes.   Abdominal: Soft. Bowel sounds are normal. He exhibits no distension and no mass. There is no hepatosplenomegaly. There is no tenderness. There is no rebound and no guarding.   Musculoskeletal: Normal range of motion. He exhibits no tenderness.   Lymphadenopathy:     He has no cervical adenopathy.   Neurological: He is alert and oriented to person, place, and time. No cranial nerve deficit. Coordination normal.   Skin: Skin is warm and dry. No rash noted. No erythema.   Psychiatric: He has a normal mood and affect. His  behavior is normal. Judgment and thought content normal.   Nursing note and vitals reviewed.  gu no hernia  [place]: prost enlarged. nontend no nodules  Assessment:           2. Iron deficiency anemia    3. Hyperlipidemia    4. Essential hypertension      bph/nocturia  gerd  Vit d defic  Chronic cough  All rhin  Plan:       Lab 12 months and follow up after    If ua ok then poss inc flomax and if doesn't help consider finasteride    Take daily nasonex  Avoid zyrtec for now sec to bph    F/u óscar 4 weeks on cough and bph. If still cough then plan pfts poss pulm;if flomax is increased (if ua ok) nocturia not down to 2 or less consider adding finasteride +/- urol    nasonex daily  Avoid zyrtec for now  Shingrix new shingles vaccine  via a pharmacy  Essential hypertension  -     Comprehensive metabolic panel; Future; Expected date: 08/15/2019  -     Lipid panel; Future; Expected date: 08/15/2019  -     Comprehensive metabolic panel; Future; Expected date: 08/14/2020  -     Lipid panel; Future; Expected date: 08/14/2020    Iron deficiency anemia secondary to inadequate dietary iron intake  -     CBC auto differential; Future; Expected date: 08/15/2019  -     Ferritin; Future; Expected date: 08/15/2019  -     CBC auto differential; Future; Expected date: 08/15/2020  -     Ferritin; Future; Expected date: 08/15/2020    Gastroesophageal reflux disease without esophagitis    Screening for prostate cancer  -     PSA, Screening; Future; Expected date: 08/15/2019  -     PSA, Screening; Future; Expected date: 08/14/2020    Vitamin D deficiency  -     Vitamin D; Future; Expected date: 08/15/2019    Dysuria  -     Urine culture; Future; Expected date: 08/15/2019  -     Urinalysis; Future; Expected date: 08/15/2019    Chronic cough  -     X-Ray Chest PA And Lateral; Future; Expected date: 08/15/2019

## 2019-08-20 ENCOUNTER — LAB VISIT (OUTPATIENT)
Dept: LAB | Facility: HOSPITAL | Age: 69
End: 2019-08-20
Attending: FAMILY MEDICINE
Payer: MEDICARE

## 2019-08-20 DIAGNOSIS — I10 ESSENTIAL HYPERTENSION: Chronic | ICD-10-CM

## 2019-08-20 DIAGNOSIS — Z12.5 SCREENING FOR PROSTATE CANCER: ICD-10-CM

## 2019-08-20 DIAGNOSIS — E55.9 VITAMIN D DEFICIENCY: ICD-10-CM

## 2019-08-20 DIAGNOSIS — D50.8 IRON DEFICIENCY ANEMIA SECONDARY TO INADEQUATE DIETARY IRON INTAKE: Chronic | ICD-10-CM

## 2019-08-20 LAB
25(OH)D3+25(OH)D2 SERPL-MCNC: 29 NG/ML (ref 30–96)
ALBUMIN SERPL BCP-MCNC: 3.8 G/DL (ref 3.5–5.2)
ALP SERPL-CCNC: 72 U/L (ref 55–135)
ALT SERPL W/O P-5'-P-CCNC: 49 U/L (ref 10–44)
ANION GAP SERPL CALC-SCNC: 9 MMOL/L (ref 8–16)
AST SERPL-CCNC: 43 U/L (ref 10–40)
BASOPHILS # BLD AUTO: 0.06 K/UL (ref 0–0.2)
BASOPHILS NFR BLD: 1.1 % (ref 0–1.9)
BILIRUB SERPL-MCNC: 0.7 MG/DL (ref 0.1–1)
BUN SERPL-MCNC: 11 MG/DL (ref 8–23)
CALCIUM SERPL-MCNC: 9.8 MG/DL (ref 8.7–10.5)
CHLORIDE SERPL-SCNC: 103 MMOL/L (ref 95–110)
CHOLEST SERPL-MCNC: 167 MG/DL (ref 120–199)
CHOLEST/HDLC SERPL: 3.2 {RATIO} (ref 2–5)
CO2 SERPL-SCNC: 22 MMOL/L (ref 23–29)
COMPLEXED PSA SERPL-MCNC: 1 NG/ML (ref 0–4)
CREAT SERPL-MCNC: 0.9 MG/DL (ref 0.5–1.4)
DIFFERENTIAL METHOD: ABNORMAL
EOSINOPHIL # BLD AUTO: 0.2 K/UL (ref 0–0.5)
EOSINOPHIL NFR BLD: 2.9 % (ref 0–8)
ERYTHROCYTE [DISTWIDTH] IN BLOOD BY AUTOMATED COUNT: 14.2 % (ref 11.5–14.5)
EST. GFR  (AFRICAN AMERICAN): >60 ML/MIN/1.73 M^2
EST. GFR  (NON AFRICAN AMERICAN): >60 ML/MIN/1.73 M^2
FERRITIN SERPL-MCNC: 47 NG/ML (ref 20–300)
GLUCOSE SERPL-MCNC: 106 MG/DL (ref 70–110)
HCT VFR BLD AUTO: 42.7 % (ref 40–54)
HDLC SERPL-MCNC: 53 MG/DL (ref 40–75)
HDLC SERPL: 31.7 % (ref 20–50)
HGB BLD-MCNC: 13.3 G/DL (ref 14–18)
IMM GRANULOCYTES # BLD AUTO: 0.01 K/UL (ref 0–0.04)
IMM GRANULOCYTES NFR BLD AUTO: 0.2 % (ref 0–0.5)
LDLC SERPL CALC-MCNC: 86.2 MG/DL (ref 63–159)
LYMPHOCYTES # BLD AUTO: 2 K/UL (ref 1–4.8)
LYMPHOCYTES NFR BLD: 35.9 % (ref 18–48)
MCH RBC QN AUTO: 25.4 PG (ref 27–31)
MCHC RBC AUTO-ENTMCNC: 31.1 G/DL (ref 32–36)
MCV RBC AUTO: 82 FL (ref 82–98)
MONOCYTES # BLD AUTO: 0.6 K/UL (ref 0.3–1)
MONOCYTES NFR BLD: 10.2 % (ref 4–15)
NEUTROPHILS # BLD AUTO: 2.8 K/UL (ref 1.8–7.7)
NEUTROPHILS NFR BLD: 49.7 % (ref 38–73)
NONHDLC SERPL-MCNC: 114 MG/DL
NRBC BLD-RTO: 0 /100 WBC
PLATELET # BLD AUTO: 202 K/UL (ref 150–350)
PMV BLD AUTO: 10.9 FL (ref 9.2–12.9)
POTASSIUM SERPL-SCNC: 4.7 MMOL/L (ref 3.5–5.1)
PROT SERPL-MCNC: 7 G/DL (ref 6–8.4)
RBC # BLD AUTO: 5.23 M/UL (ref 4.6–6.2)
SODIUM SERPL-SCNC: 134 MMOL/L (ref 136–145)
TRIGL SERPL-MCNC: 139 MG/DL (ref 30–150)
WBC # BLD AUTO: 5.6 K/UL (ref 3.9–12.7)

## 2019-08-20 PROCEDURE — 85025 COMPLETE CBC W/AUTO DIFF WBC: CPT

## 2019-08-20 PROCEDURE — 82728 ASSAY OF FERRITIN: CPT

## 2019-08-20 PROCEDURE — 80053 COMPREHEN METABOLIC PANEL: CPT

## 2019-08-20 PROCEDURE — 80061 LIPID PANEL: CPT

## 2019-08-20 PROCEDURE — 36415 COLL VENOUS BLD VENIPUNCTURE: CPT

## 2019-08-20 PROCEDURE — 84153 ASSAY OF PSA TOTAL: CPT

## 2019-08-20 PROCEDURE — 82306 VITAMIN D 25 HYDROXY: CPT

## 2019-09-12 ENCOUNTER — OFFICE VISIT (OUTPATIENT)
Dept: INTERNAL MEDICINE | Facility: CLINIC | Age: 69
End: 2019-09-12
Payer: MEDICARE

## 2019-09-12 VITALS
WEIGHT: 190.25 LBS | HEART RATE: 60 BPM | HEIGHT: 69 IN | OXYGEN SATURATION: 98 % | BODY MASS INDEX: 28.18 KG/M2 | TEMPERATURE: 98 F | DIASTOLIC BLOOD PRESSURE: 68 MMHG | SYSTOLIC BLOOD PRESSURE: 112 MMHG

## 2019-09-12 DIAGNOSIS — Z09 FOLLOW UP: Primary | ICD-10-CM

## 2019-09-12 DIAGNOSIS — R07.0 THROAT DISCOMFORT: ICD-10-CM

## 2019-09-12 DIAGNOSIS — R35.1 BPH ASSOCIATED WITH NOCTURIA: ICD-10-CM

## 2019-09-12 DIAGNOSIS — N40.1 BPH ASSOCIATED WITH NOCTURIA: ICD-10-CM

## 2019-09-12 DIAGNOSIS — R05.3 COUGH, PERSISTENT: ICD-10-CM

## 2019-09-12 PROCEDURE — 99999 PR PBB SHADOW E&M-EST. PATIENT-LVL IV: CPT | Mod: PBBFAC,,, | Performed by: NURSE PRACTITIONER

## 2019-09-12 PROCEDURE — 99214 PR OFFICE/OUTPT VISIT, EST, LEVL IV, 30-39 MIN: ICD-10-PCS | Mod: S$PBB,,, | Performed by: NURSE PRACTITIONER

## 2019-09-12 PROCEDURE — 99214 OFFICE O/P EST MOD 30 MIN: CPT | Mod: PBBFAC | Performed by: NURSE PRACTITIONER

## 2019-09-12 PROCEDURE — 99214 OFFICE O/P EST MOD 30 MIN: CPT | Mod: S$PBB,,, | Performed by: NURSE PRACTITIONER

## 2019-09-12 PROCEDURE — 99999 PR PBB SHADOW E&M-EST. PATIENT-LVL IV: ICD-10-PCS | Mod: PBBFAC,,, | Performed by: NURSE PRACTITIONER

## 2019-09-12 RX ORDER — PROMETHAZINE HYDROCHLORIDE AND DEXTROMETHORPHAN HYDROBROMIDE 6.25; 15 MG/5ML; MG/5ML
5 SYRUP ORAL
Qty: 118 ML | Refills: 0 | Status: SHIPPED | OUTPATIENT
Start: 2019-09-12 | End: 2019-09-22

## 2019-09-12 RX ORDER — TAMSULOSIN HYDROCHLORIDE 0.4 MG/1
0.8 CAPSULE ORAL DAILY
Qty: 60 CAPSULE | Refills: 11 | Status: SHIPPED | OUTPATIENT
Start: 2019-09-12 | End: 2020-08-17

## 2019-09-12 NOTE — PROGRESS NOTES
Subjective:       Patient ID: Eduardo Krishnamurthy is a 68 y.o. male.    Chief Complaint: Follow-up (4 weeks) and Cough    Patient presents for a follow up. Continues to have cough.  CXR-negative.  Reports feeling something in throat.  Clearing throat often.  Started nasal spray.  Not great improvement.  Reports last week was a little worse.   U/a is normal.  Will increase Flomax to 0.8 mg daily.     Review of Systems   Constitutional: Negative for chills and fatigue.   Respiratory: Positive for cough and shortness of breath (while walking at [employer] ).    Cardiovascular: Negative for chest pain.   Genitourinary: Positive for frequency. Negative for dysuria.   Musculoskeletal: Negative for arthralgias and gait problem.   Skin: Negative for color change and wound.   Psychiatric/Behavioral: Negative for agitation and confusion.       Objective:      Physical Exam   Constitutional: He is oriented to person, place, and time. Vital signs are normal. He appears well-developed and well-nourished.   HENT:   Head: Normocephalic and atraumatic.   Mouth/Throat: Posterior oropharyngeal erythema present.   Neck: Normal range of motion.   Cardiovascular: Normal rate and regular rhythm.   Pulmonary/Chest: Effort normal and breath sounds normal.   Musculoskeletal: Normal range of motion.   Neurological: He is alert and oriented to person, place, and time.   Skin: Skin is warm.   Psychiatric: He has a normal mood and affect. His behavior is normal.       Assessment:       1. Follow up    2. Cough, persistent    3. Throat discomfort    4. BPH associated with nocturia        Plan:         Follow up    Cough, persistent  -     Ambulatory Referral to ENT    Throat discomfort  -     Ambulatory Referral to ENT    BPH associated with nocturia  -     tamsulosin (FLOMAX) 0.4 mg Cap; Take 2 capsules (0.8 mg total) by mouth once daily.  Dispense: 60 capsule; Refill: 11    Other orders  -     promethazine-dextromethorphan (PROMETHAZINE-DM) 6.25-15  mg/5 mL Syrp; Take 5 mLs by mouth every 6 to 8 hours as needed (cough). Do not drive/operate heavy machinery while taking this medication.  Dispense: 118 mL; Refill: 0        Increased Flomax.   Recommended PFT's per Dr. Duval.  Patient wants to hold off for now.  Will schedule ENT appointment if needed.  Advised to follow up in a few weeks with an update by phone or portal.

## 2019-11-25 ENCOUNTER — PATIENT MESSAGE (OUTPATIENT)
Dept: INTERNAL MEDICINE | Facility: CLINIC | Age: 69
End: 2019-11-25

## 2019-11-25 DIAGNOSIS — R79.89 ELEVATED LFTS: Primary | ICD-10-CM

## 2019-11-25 DIAGNOSIS — E61.1 IRON DEFICIENCY: ICD-10-CM

## 2019-12-09 ENCOUNTER — PATIENT MESSAGE (OUTPATIENT)
Dept: INTERNAL MEDICINE | Facility: CLINIC | Age: 69
End: 2019-12-09

## 2019-12-09 DIAGNOSIS — E78.5 HYPERLIPIDEMIA, UNSPECIFIED HYPERLIPIDEMIA TYPE: ICD-10-CM

## 2019-12-09 RX ORDER — PRAVASTATIN SODIUM 20 MG/1
20 TABLET ORAL DAILY
Qty: 90 TABLET | Refills: 3 | Status: CANCELLED | OUTPATIENT
Start: 2019-12-09

## 2019-12-09 RX ORDER — PRAVASTATIN SODIUM 20 MG/1
20 TABLET ORAL DAILY
Qty: 90 TABLET | Refills: 3 | Status: SHIPPED | OUTPATIENT
Start: 2019-12-09 | End: 2020-08-17 | Stop reason: SDUPTHER

## 2019-12-09 NOTE — TELEPHONE ENCOUNTER
Please call him at Phone number is [phone].  and let him know I sent his refill pravastatin to Pharmacy at [address] [place] [address]

## 2019-12-09 NOTE — TELEPHONE ENCOUNTER
----- Message from Emir Avila sent at 12/9/2019 10:34 AM CST -----  Contact: pt   Type:  RX Refill Request    Who Called:  Pt   Refill or New Rx: refill   RX Name and Strength: prevastatin sodium 20 mg   How is the patient currently taking it? (ex. 1XDay):once daily   Is this a 30 day or 90 day RX: 90 days   Preferred Pharmacy with phone number:[facility] [phone]  Local or Mail Order: local   Ordering Provider:godfrey   Would the patient rather a call back or a response via [facility]? phone  Best Call Back Number: [phone]  Additional Information: [place] and pls call when it's called in

## 2020-01-28 ENCOUNTER — PATIENT MESSAGE (OUTPATIENT)
Dept: PULMONOLOGY | Facility: CLINIC | Age: 70
End: 2020-01-28

## 2020-01-28 ENCOUNTER — OFFICE VISIT (OUTPATIENT)
Dept: INTERNAL MEDICINE | Facility: CLINIC | Age: 70
End: 2020-01-28
Payer: MEDICARE

## 2020-01-28 VITALS
DIASTOLIC BLOOD PRESSURE: 74 MMHG | OXYGEN SATURATION: 99 % | HEART RATE: 66 BPM | TEMPERATURE: 97 F | HEIGHT: 69 IN | WEIGHT: 190.06 LBS | SYSTOLIC BLOOD PRESSURE: 114 MMHG | BODY MASS INDEX: 28.15 KG/M2

## 2020-01-28 DIAGNOSIS — R05.3 CHRONIC COUGH: Primary | ICD-10-CM

## 2020-01-28 PROCEDURE — 1159F PR MEDICATION LIST DOCUMENTED IN MEDICAL RECORD: ICD-10-PCS | Mod: ,,, | Performed by: PHYSICIAN ASSISTANT

## 2020-01-28 PROCEDURE — 1126F PR PAIN SEVERITY QUANTIFIED, NO PAIN PRESENT: ICD-10-PCS | Mod: ,,, | Performed by: PHYSICIAN ASSISTANT

## 2020-01-28 PROCEDURE — 1126F AMNT PAIN NOTED NONE PRSNT: CPT | Mod: ,,, | Performed by: PHYSICIAN ASSISTANT

## 2020-01-28 PROCEDURE — 99214 PR OFFICE/OUTPT VISIT, EST, LEVL IV, 30-39 MIN: ICD-10-PCS | Mod: S$PBB,,, | Performed by: PHYSICIAN ASSISTANT

## 2020-01-28 PROCEDURE — 1159F MED LIST DOCD IN RCRD: CPT | Mod: ,,, | Performed by: PHYSICIAN ASSISTANT

## 2020-01-28 PROCEDURE — 99215 OFFICE O/P EST HI 40 MIN: CPT | Mod: PBBFAC | Performed by: PHYSICIAN ASSISTANT

## 2020-01-28 PROCEDURE — 99999 PR PBB SHADOW E&M-EST. PATIENT-LVL V: CPT | Mod: PBBFAC,,, | Performed by: PHYSICIAN ASSISTANT

## 2020-01-28 PROCEDURE — 99999 PR PBB SHADOW E&M-EST. PATIENT-LVL V: ICD-10-PCS | Mod: PBBFAC,,, | Performed by: PHYSICIAN ASSISTANT

## 2020-01-28 PROCEDURE — 99214 OFFICE O/P EST MOD 30 MIN: CPT | Mod: S$PBB,,, | Performed by: PHYSICIAN ASSISTANT

## 2020-01-28 RX ORDER — MONTELUKAST SODIUM 10 MG/1
10 TABLET ORAL NIGHTLY
Qty: 30 TABLET | Refills: 1 | Status: SHIPPED | OUTPATIENT
Start: 2020-01-28 | End: 2020-08-17 | Stop reason: SDUPTHER

## 2020-01-28 RX ORDER — HYDROCODONE POLISTIREX AND CHLORPHENIRAMINE POLISTIREX 10; 8 MG/5ML; MG/5ML
5 SUSPENSION, EXTENDED RELEASE ORAL NIGHTLY PRN
Qty: 70 ML | Refills: 0 | Status: SHIPPED | OUTPATIENT
Start: 2020-01-28 | End: 2020-02-07

## 2020-01-28 NOTE — PROGRESS NOTES
Subjective:      Patient ID: Eduardo Krishnamurthy is a 69 y.o. male.    Chief Complaint: Cough    Cough   This is a recurrent problem. The current episode started 1 to 4 weeks ago. The problem has been waxing and waning. The problem occurs constantly. The cough is non-productive. Associated symptoms include heartburn, nasal congestion, postnasal drip, shortness of breath and wheezing. Pertinent negatives include no chest pain, chills, ear congestion, ear pain, fever, headaches, hemoptysis, myalgias, rash, rhinorrhea, sore throat, sweats or weight loss. Treatments tried: mucinex.   Pt is moving to [place] in 2 weeks.    Has not had pulm fx tests done.  Using nasonex inconsistently.   GERD controlledon nexium 40mg daily. Denies active or uncontrolled heartburn.     Review of Systems   Constitutional: Negative for activity change, appetite change, chills, diaphoresis, fatigue, fever, unexpected weight change and weight loss.   HENT: Positive for congestion, postnasal drip and sinus pressure. Negative for ear pain, hearing loss, rhinorrhea, sore throat, trouble swallowing and voice change.    Eyes: Negative.  Negative for visual disturbance.   Respiratory: Positive for cough, chest tightness, shortness of breath and wheezing. Negative for apnea, hemoptysis, choking and stridor.    Cardiovascular: Negative for chest pain, palpitations and leg swelling.   Gastrointestinal: Positive for heartburn. Negative for abdominal distention, abdominal pain, blood in stool, constipation, diarrhea, nausea and vomiting.   Endocrine: Negative for cold intolerance, heat intolerance, polydipsia and polyuria.   Genitourinary: Negative.  Negative for difficulty urinating and frequency.   Musculoskeletal: Negative for arthralgias, back pain, gait problem, joint swelling and myalgias.   Skin: Negative for color change, pallor, rash and wound.   Neurological: Negative for dizziness, tremors, weakness, light-headedness, numbness and headaches.  "  Hematological: Negative for adenopathy.   Psychiatric/Behavioral: Negative for behavioral problems, confusion, self-injury, sleep disturbance and suicidal ideas. The patient is not nervous/anxious.      Objective:   /74 (BP Location: Left arm, Patient Position: Sitting, BP Method: Medium (Manual))   Pulse 66   Temp 96.7 °F (35.9 °C) (Tympanic)   Ht 5' 9" (1.753 m)   Wt 86.2 kg (190 lb 0.6 oz)   SpO2 99%   BMI 28.06 kg/m²     Physical Exam   Constitutional: He is oriented to person, place, and time. He appears well-developed and well-nourished. No distress.   HENT:   Head: Normocephalic and atraumatic.   Right Ear: External ear normal.   Left Ear: External ear normal.   Nose: Nose normal.   Mouth/Throat: Oropharynx is clear and moist. No oropharyngeal exudate.   Eyes: Pupils are equal, round, and reactive to light. Conjunctivae and EOM are normal.   Neck: Normal range of motion. Neck supple.   Cardiovascular: Normal rate, regular rhythm and normal heart sounds. Exam reveals no gallop and no friction rub.   No murmur heard.  Pulmonary/Chest: Effort normal and breath sounds normal. No respiratory distress. He has no wheezes. He has no rales. He exhibits no tenderness.   Abdominal: Soft. He exhibits no distension. There is no tenderness.   Musculoskeletal: Normal range of motion.   Lymphadenopathy:     He has no cervical adenopathy.   Neurological: He is alert and oriented to person, place, and time.   Skin: Skin is warm and dry. He is not diaphoretic.   Psychiatric: He has a normal mood and affect. His behavior is normal. Judgment and thought content normal.   Vitals reviewed.      Assessment:     1. Chronic cough      Plan:   Chronic cough  -     Ambulatory Referral to Pulmonology  -     hydrocodone-chlorpheniramine (TUSSIONEX) 10-8 mg/5 mL suspension; Take 5 mLs by mouth nightly as needed for Cough.  Dispense: 70 mL; Refill: 0  -     montelukast (SINGULAIR) 10 mg tablet; Take 1 tablet (10 mg total) by " mouth every evening.  Dispense: 30 tablet; Refill: 1    -use nasal spray regularly  -try delsym over the counter cough syrup during the day  -recommend PFT and eval with ENT or allergist when he moves to [place]. Will try to get him in for PFT before he moves.     Follow up if symptoms worsen or fail to improve.

## 2020-01-28 NOTE — PATIENT INSTRUCTIONS

## 2020-01-30 NOTE — TELEPHONE ENCOUNTER
2nd Attempt    Patient stated he is moving to [place] today and cannot schedule this appointment.    Removed from work queue as such.

## 2020-08-07 ENCOUNTER — TELEPHONE (OUTPATIENT)
Dept: INTERNAL MEDICINE | Facility: CLINIC | Age: 70
End: 2020-08-07

## 2020-08-07 NOTE — TELEPHONE ENCOUNTER
----- Message from Pam Ochoa sent at 8/7/2020 11:13 AM CDT -----  Regarding: orders  Pt is currently if [place] he stated he spoke to [facility] and was advised lab orders will be faxed. Pt stated facility has not received orders ...[phone] (home)

## 2020-08-10 ENCOUNTER — TELEPHONE (OUTPATIENT)
Dept: INTERNAL MEDICINE | Facility: CLINIC | Age: 70
End: 2020-08-10

## 2020-08-10 NOTE — TELEPHONE ENCOUNTER
----- Message from Emir Avila sent at 8/10/2020  3:05 PM CDT -----  Contact: Pt  Pt is calling to make sure that the Dr will be in on the date of his appt/ Pt is coming in from out of town and is having some problems and can be reached at [phone]//thanks/dbw     Pt is requesting to speak with Ms Collins     
I returned a call to the pt who was just confirming that he is scheduled for with  on 8/17/20 . I confirmed and he verbalized understanding . //kah  
[facility]

## 2020-08-17 ENCOUNTER — LAB VISIT (OUTPATIENT)
Dept: LAB | Facility: HOSPITAL | Age: 70
End: 2020-08-17
Attending: FAMILY MEDICINE
Payer: MEDICARE

## 2020-08-17 ENCOUNTER — OFFICE VISIT (OUTPATIENT)
Dept: INTERNAL MEDICINE | Facility: CLINIC | Age: 70
End: 2020-08-17
Payer: MEDICARE

## 2020-08-17 VITALS
RESPIRATION RATE: 16 BRPM | TEMPERATURE: 97 F | WEIGHT: 186.06 LBS | HEART RATE: 60 BPM | HEIGHT: 69 IN | SYSTOLIC BLOOD PRESSURE: 106 MMHG | DIASTOLIC BLOOD PRESSURE: 68 MMHG | BODY MASS INDEX: 27.56 KG/M2 | OXYGEN SATURATION: 98 %

## 2020-08-17 DIAGNOSIS — Z12.5 SCREENING FOR PROSTATE CANCER: ICD-10-CM

## 2020-08-17 DIAGNOSIS — E55.9 VITAMIN D DEFICIENCY: ICD-10-CM

## 2020-08-17 DIAGNOSIS — R05.3 CHRONIC COUGH: ICD-10-CM

## 2020-08-17 DIAGNOSIS — R35.1 BPH ASSOCIATED WITH NOCTURIA: ICD-10-CM

## 2020-08-17 DIAGNOSIS — R79.89 ELEVATED LFTS: ICD-10-CM

## 2020-08-17 DIAGNOSIS — E78.5 HYPERLIPIDEMIA, UNSPECIFIED HYPERLIPIDEMIA TYPE: ICD-10-CM

## 2020-08-17 DIAGNOSIS — E61.1 IRON DEFICIENCY: ICD-10-CM

## 2020-08-17 DIAGNOSIS — I10 ESSENTIAL HYPERTENSION: ICD-10-CM

## 2020-08-17 DIAGNOSIS — I10 ESSENTIAL HYPERTENSION: Primary | ICD-10-CM

## 2020-08-17 DIAGNOSIS — N40.1 BPH ASSOCIATED WITH NOCTURIA: ICD-10-CM

## 2020-08-17 LAB
BASOPHILS # BLD AUTO: 0.05 K/UL (ref 0–0.2)
BASOPHILS NFR BLD: 0.8 % (ref 0–1.9)
DIFFERENTIAL METHOD: ABNORMAL
EOSINOPHIL # BLD AUTO: 0.1 K/UL (ref 0–0.5)
EOSINOPHIL NFR BLD: 1.9 % (ref 0–8)
ERYTHROCYTE [DISTWIDTH] IN BLOOD BY AUTOMATED COUNT: 14.5 % (ref 11.5–14.5)
HCT VFR BLD AUTO: 41.3 % (ref 40–54)
HGB BLD-MCNC: 12.7 G/DL (ref 14–18)
IMM GRANULOCYTES # BLD AUTO: 0 K/UL (ref 0–0.04)
IMM GRANULOCYTES NFR BLD AUTO: 0 % (ref 0–0.5)
LYMPHOCYTES # BLD AUTO: 2.9 K/UL (ref 1–4.8)
LYMPHOCYTES NFR BLD: 47 % (ref 18–48)
MCH RBC QN AUTO: 25.2 PG (ref 27–31)
MCHC RBC AUTO-ENTMCNC: 30.8 G/DL (ref 32–36)
MCV RBC AUTO: 82 FL (ref 82–98)
MONOCYTES # BLD AUTO: 0.6 K/UL (ref 0.3–1)
MONOCYTES NFR BLD: 9 % (ref 4–15)
NEUTROPHILS # BLD AUTO: 2.6 K/UL (ref 1.8–7.7)
NEUTROPHILS NFR BLD: 41.3 % (ref 38–73)
NRBC BLD-RTO: 0 /100 WBC
PLATELET # BLD AUTO: 170 K/UL (ref 150–350)
PMV BLD AUTO: 11.9 FL (ref 9.2–12.9)
RBC # BLD AUTO: 5.04 M/UL (ref 4.6–6.2)
WBC # BLD AUTO: 6.25 K/UL (ref 3.9–12.7)

## 2020-08-17 PROCEDURE — 99999 PR PBB SHADOW E&M-EST. PATIENT-LVL III: CPT | Mod: PBBFAC,,, | Performed by: FAMILY MEDICINE

## 2020-08-17 PROCEDURE — 80053 COMPREHEN METABOLIC PANEL: CPT

## 2020-08-17 PROCEDURE — 87340 HEPATITIS B SURFACE AG IA: CPT

## 2020-08-17 PROCEDURE — 80061 LIPID PANEL: CPT

## 2020-08-17 PROCEDURE — 86803 HEPATITIS C AB TEST: CPT

## 2020-08-17 PROCEDURE — 84153 ASSAY OF PSA TOTAL: CPT

## 2020-08-17 PROCEDURE — 82728 ASSAY OF FERRITIN: CPT

## 2020-08-17 PROCEDURE — 83540 ASSAY OF IRON: CPT

## 2020-08-17 PROCEDURE — 99213 OFFICE O/P EST LOW 20 MIN: CPT | Mod: PBBFAC | Performed by: FAMILY MEDICINE

## 2020-08-17 PROCEDURE — 36415 COLL VENOUS BLD VENIPUNCTURE: CPT

## 2020-08-17 PROCEDURE — 82306 VITAMIN D 25 HYDROXY: CPT

## 2020-08-17 PROCEDURE — 85025 COMPLETE CBC W/AUTO DIFF WBC: CPT

## 2020-08-17 PROCEDURE — 99999 PR PBB SHADOW E&M-EST. PATIENT-LVL III: ICD-10-PCS | Mod: PBBFAC,,, | Performed by: FAMILY MEDICINE

## 2020-08-17 PROCEDURE — 99214 OFFICE O/P EST MOD 30 MIN: CPT | Mod: S$PBB,,, | Performed by: FAMILY MEDICINE

## 2020-08-17 PROCEDURE — 99214 PR OFFICE/OUTPT VISIT, EST, LEVL IV, 30-39 MIN: ICD-10-PCS | Mod: S$PBB,,, | Performed by: FAMILY MEDICINE

## 2020-08-17 RX ORDER — PRAVASTATIN SODIUM 20 MG/1
20 TABLET ORAL DAILY
Qty: 90 TABLET | Refills: 3 | Status: SHIPPED | OUTPATIENT
Start: 2020-08-17 | End: 2020-11-09 | Stop reason: SDUPTHER

## 2020-08-17 RX ORDER — MOMETASONE FUROATE 50 UG/1
SPRAY, METERED NASAL
Qty: 3 EACH | Refills: 3 | Status: SHIPPED | OUTPATIENT
Start: 2020-08-17 | End: 2020-11-09 | Stop reason: SDUPTHER

## 2020-08-17 RX ORDER — HYDROCODONE POLISTIREX AND CHLORPHENIRAMINE POLISTIREX 10; 8 MG/5ML; MG/5ML
SUSPENSION, EXTENDED RELEASE ORAL
Qty: 70 ML | Refills: 0 | Status: SHIPPED | OUTPATIENT
Start: 2020-08-17

## 2020-08-17 RX ORDER — MONTELUKAST SODIUM 10 MG/1
10 TABLET ORAL NIGHTLY
Qty: 90 TABLET | Refills: 4 | Status: SHIPPED | OUTPATIENT
Start: 2020-08-17 | End: 2020-09-16

## 2020-08-17 RX ORDER — LOSARTAN POTASSIUM 50 MG/1
50 TABLET ORAL DAILY
Qty: 90 TABLET | Refills: 3 | Status: SHIPPED | OUTPATIENT
Start: 2020-08-17 | End: 2020-11-09 | Stop reason: SDUPTHER

## 2020-08-17 RX ORDER — ESOMEPRAZOLE MAGNESIUM 40 MG/1
40 CAPSULE, DELAYED RELEASE ORAL
Qty: 90 CAPSULE | Refills: 3 | Status: SHIPPED | OUTPATIENT
Start: 2020-08-17 | End: 2020-11-09 | Stop reason: SDUPTHER

## 2020-08-17 RX ORDER — TAMSULOSIN HYDROCHLORIDE 0.4 MG/1
0.8 CAPSULE ORAL DAILY
Qty: 180 CAPSULE | Refills: 3 | Status: SHIPPED | OUTPATIENT
Start: 2020-08-17 | End: 2020-11-09 | Stop reason: SDUPTHER

## 2020-08-17 RX ORDER — DILTIAZEM HYDROCHLORIDE 240 MG/1
240 CAPSULE, EXTENDED RELEASE ORAL NIGHTLY
Qty: 90 CAPSULE | Refills: 3 | Status: SHIPPED | OUTPATIENT
Start: 2020-08-17 | End: 2020-11-09 | Stop reason: SDUPTHER

## 2020-08-17 NOTE — PATIENT INSTRUCTIONS
Try stopping nexium: resume if heartburn or chronic cough resumes  Please see GI in [place] for colonoscopy

## 2020-08-17 NOTE — PROGRESS NOTES
Subjective:       Patient ID: Eduardo Krishnamurthy is a . male.    Chief Complaint:Multiple issues see below      HPI Hypertension: blood pressures at home normal. Tolerating medicine.   Iron def anemia sec jejunal erosions  nsaids ;;; hx intermitt iron daily  GERD asympt. Tolerating medication. On daily nxium   All rhin: infreq nasonex and rare zyrtec  Vit d defic: almost a year off weekly vit d ;ty otc. On daily    chronc cough mostly resolved w nasonex, singulair . infreq need tussionex req rf    Bph: flomax 2 alt 1 daily 9better at 3-4 nightly    elev lfts last yr prior was nl even when on statin    Since 1/20 living in [place] and working from home [employer] work    Past Medical History:   Diagnosis Date    BPH (benign prostatic hyperplasia)     GERD (gastroesophageal reflux disease)     Hypertension     Iron deficiency anemia, unspecified     jejunal etc erosions 4/15     Past Surgical History:   Procedure Laterality Date    KNEE SURGERY      right     Family History   Problem Relation Age of Onset    No Known Problems Mother     Hypertension Father     Hypertension Sister     Stroke Sister      Social History     Socioeconomic History    Marital status:      Spouse name: Not on file    Number of children: 3    Years of education: Not on file    Highest education level: Not on file   Occupational History    Not on file   Social Needs    Financial resource strain: Not on file    Food insecurity     Worry: Not on file     Inability: Not on file    Transportation needs     Medical: Not on file     Non-medical: Not on file   Tobacco Use    Smoking status: Never Smoker    Smokeless tobacco: Never Used   Substance and Sexual Activity    Alcohol use: No    Drug use: No    Sexual activity: Never   Lifestyle    Physical activity     Days per week: Not on file     Minutes per session: Not on file    Stress: Not on file   Relationships    Social connections     Talks on phone: Not on file     Gets  together: Not on file     Attends Synagogue service: Not on file     Active member of club or organization: Not on file     Attends meetings of clubs or organizations: Not on file     Relationship status: Not on file   Other Topics Concern    Not on file   Social History Narrative    torres guajardo moved here 2014;      Originally from [place]; no pets or smokers in household.                   Review of Systems  Cardiovascular: no chest pain  Chest: no shortness of breath  Abd: no abd pain  Remainder review of systems negative    Objective:      Physical Exam   Constitutional: He is oriented to person, place, and time. He appears well-developed and well-nourished.   HENT:   Head: Normocephalic and atraumatic.   Right Ear: External ear normal.   Left Ear: External ear normal.   Nose: Nose normal.   Mouth/Throat: Oropharynx is clear and moist.   Nl tms   Eyes: Conjunctivae and EOM are normal. Pupils are equal, round, and reactive to light. No scleral icterus.   Neck: Normal range of motion. Neck supple. Carotid bruit is not present.   Cardiovascular: Normal rate, regular rhythm and normal heart sounds.  Exam reveals no gallop and no friction rub.    No murmur heard.  Pulmonary/Chest: Effort normal and breath sounds normal. He has no wheezes.   Abdominal: Soft. Bowel sounds are normal. He exhibits no distension and no mass. There is no hepatosplenomegaly. There is no tenderness. There is no rebound and no guarding.   Musculoskeletal: Normal range of motion. He exhibits no tenderness.   Lymphadenopathy:     He has no cervical adenopathy.   Neurological: He is alert and oriented to person, place, and time. No cranial nerve deficit. Coordination normal.   Skin: Skin is warm and dry. No rash noted. No erythema.   Psychiatric: He has a normal mood and affect. His behavior is normal. Judgment and thought content normal.   Nursing note and vitals reviewed.  gu no hernia    Assessment:           2. Iron  deficiency anemia    3. Hyperlipidemia    4. Essential hypertension      bph/nocturia  gerd  Vit d defic  All rhin  elev lfts  Plan:       Try stopping nexium: resume if heartburn or chronic cough resumes    Lab today  also  Lab 12 months and follow up after  Try inc flomax to two nighlty instead of alt    He plans to speak GI dr Marcos for cscope    Essential hypertension  -     Lipid Panel; Future; Expected date: 08/17/2020  -     Comprehensive metabolic panel; Future; Expected date: 08/17/2020  -     Comprehensive metabolic panel; Future; Expected date: 08/17/2021  -     Lipid Panel; Future; Expected date: 08/17/2021    Screening for prostate cancer  -     PSA, Screening; Future; Expected date: 08/17/2020  -     PSA, Screening; Future; Expected date: 08/17/2021    Elevated LFTs  -     Hepatitis C Antibody; Future; Expected date: 08/17/2020  -     Hepatitis B Surface Antigen; Future; Expected date: 08/17/2020    Iron deficiency  -     CBC auto differential; Future; Expected date: 08/17/2020  -     Ferritin; Future; Expected date: 08/17/2020  -     Iron and TIBC; Future; Expected date: 08/17/2020  -     Iron and TIBC; Future; Expected date: 08/17/2020  -     CBC auto differential; Future; Expected date: 08/17/2021  -     Ferritin; Future; Expected date: 08/17/2021  -     Iron and TIBC; Future; Expected date: 08/17/2021    Vitamin D deficiency  -     Vitamin D; Future; Expected date: 08/17/2020  -     Vitamin D; Future; Expected date: 08/17/2021    Chronic cough  -     montelukast (SINGULAIR) 10 mg tablet; Take 1 tablet (10 mg total) by mouth every evening.  Dispense: 90 tablet; Refill: 4    BPH associated with nocturia  -     tamsulosin (FLOMAX) 0.4 mg Cap; Take 2 capsules (0.8 mg total) by mouth once daily.  Dispense: 180 capsule; Refill: 3    Hyperlipidemia, unspecified hyperlipidemia type  -     pravastatin (PRAVACHOL) 20 MG tablet; Take 1 tablet (20 mg total) by mouth once daily.  Dispense: 90 tablet; Refill:  3    Other orders  -     losartan (COZAAR) 50 MG tablet; Take 1 tablet (50 mg total) by mouth once daily.  Dispense: 90 tablet; Refill: 3  -     diltiaZEM (TIAZAC) 240 MG Cs24; Take 1 capsule (240 mg total) by mouth every evening.  Dispense: 90 capsule; Refill: 3  -     mometasone (NASONEX) 50 mcg/actuation nasal spray; USE 2 SPRAYS IN EACH       NOSTRIL ONCE DAILY  Dispense: 3 each; Refill: 3  -     esomeprazole (NEXIUM) 40 MG capsule; Take 1 capsule (40 mg total) by mouth before breakfast.  Dispense: 90 capsule; Refill: 3  -     hydrocodone-chlorpheniramine (TUSSIONEX) 10-8 mg/5 mL suspension; Take 5 mls by mouth nightly as needed for cough  Dispense: 70 mL; Refill: 0

## 2020-08-18 LAB
25(OH)D3+25(OH)D2 SERPL-MCNC: 33 NG/ML (ref 30–96)
ALBUMIN SERPL BCP-MCNC: 4.1 G/DL (ref 3.5–5.2)
ALP SERPL-CCNC: 61 U/L (ref 55–135)
ALT SERPL W/O P-5'-P-CCNC: 26 U/L (ref 10–44)
ANION GAP SERPL CALC-SCNC: 9 MMOL/L (ref 8–16)
AST SERPL-CCNC: 27 U/L (ref 10–40)
BILIRUB SERPL-MCNC: 0.9 MG/DL (ref 0.1–1)
BUN SERPL-MCNC: 10 MG/DL (ref 8–23)
CALCIUM SERPL-MCNC: 8.7 MG/DL (ref 8.7–10.5)
CHLORIDE SERPL-SCNC: 108 MMOL/L (ref 95–110)
CHOLEST SERPL-MCNC: 163 MG/DL (ref 120–199)
CHOLEST/HDLC SERPL: 3 {RATIO} (ref 2–5)
CO2 SERPL-SCNC: 22 MMOL/L (ref 23–29)
COMPLEXED PSA SERPL-MCNC: 0.82 NG/ML (ref 0–4)
CREAT SERPL-MCNC: 1 MG/DL (ref 0.5–1.4)
EST. GFR  (AFRICAN AMERICAN): >60 ML/MIN/1.73 M^2
EST. GFR  (NON AFRICAN AMERICAN): >60 ML/MIN/1.73 M^2
FERRITIN SERPL-MCNC: 23 NG/ML (ref 20–300)
GLUCOSE SERPL-MCNC: 90 MG/DL (ref 70–110)
HDLC SERPL-MCNC: 54 MG/DL (ref 40–75)
HDLC SERPL: 33.1 % (ref 20–50)
IRON SERPL-MCNC: 86 UG/DL (ref 45–160)
IRON SERPL-MCNC: 86 UG/DL (ref 45–160)
LDLC SERPL CALC-MCNC: 95.4 MG/DL (ref 63–159)
NONHDLC SERPL-MCNC: 109 MG/DL
POTASSIUM SERPL-SCNC: 4.4 MMOL/L (ref 3.5–5.1)
PROT SERPL-MCNC: 7.2 G/DL (ref 6–8.4)
SATURATED IRON: 17 % (ref 20–50)
SATURATED IRON: 17 % (ref 20–50)
SODIUM SERPL-SCNC: 139 MMOL/L (ref 136–145)
TOTAL IRON BINDING CAPACITY: 496 UG/DL (ref 250–450)
TOTAL IRON BINDING CAPACITY: 496 UG/DL (ref 250–450)
TRANSFERRIN SERPL-MCNC: 335 MG/DL (ref 200–375)
TRANSFERRIN SERPL-MCNC: 335 MG/DL (ref 200–375)
TRIGL SERPL-MCNC: 68 MG/DL (ref 30–150)

## 2020-08-19 LAB
HBV SURFACE AG SERPL QL IA: NEGATIVE
HCV AB SERPL QL IA: NEGATIVE

## 2020-09-18 ENCOUNTER — TELEPHONE (OUTPATIENT)
Dept: INTERNAL MEDICINE | Facility: CLINIC | Age: 70
End: 2020-09-18

## 2020-09-18 NOTE — TELEPHONE ENCOUNTER
----- Message from Shreyas Duval MD sent at 9/15/2020  4:43 PM CDT -----  Was he  able to see GI doctor in [place] as planned? Lab does show that  likely leaking a bit of blood

## 2020-09-18 NOTE — TELEPHONE ENCOUNTER
S/W pt, he stated he has not called the GI doctor in [place] for an appt yet, but would today. Pt given results, verbalized understanding/jj

## 2020-10-16 ENCOUNTER — PATIENT MESSAGE (OUTPATIENT)
Dept: INTERNAL MEDICINE | Facility: CLINIC | Age: 70
End: 2020-10-16

## 2020-10-27 ENCOUNTER — PATIENT MESSAGE (OUTPATIENT)
Dept: INTERNAL MEDICINE | Facility: CLINIC | Age: 70
End: 2020-10-27

## 2020-10-28 RX ORDER — A/SINGAPORE/GP1908/2015 IVR-180 (AN A/MICHIGAN/45/2015 (H1N1)PDM09-LIKE VIRUS, A/HONG KONG/4801/2014, NYMC X-263B (H3N2) (AN A/HONG KONG/4801/2014-LIKE VIRUS), AND B/BRISBANE/60/2008, WILD TYPE (A B/BRISBANE/60/2008-LIKE VIRUS) 15; 15; 15 UG/.5ML; UG/.5ML; UG/.5ML
INJECTION, SUSPENSION INTRAMUSCULAR
COMMUNITY
Start: 2020-09-23

## 2020-10-28 RX ORDER — MONTELUKAST SODIUM 10 MG/1
TABLET ORAL
COMMUNITY
Start: 2020-01-28 | End: 2020-10-28 | Stop reason: SDUPTHER

## 2020-10-28 RX ORDER — MONTELUKAST SODIUM 10 MG/1
10 TABLET ORAL DAILY
Qty: 90 TABLET | Refills: 3 | Status: SHIPPED | OUTPATIENT
Start: 2020-10-28 | End: 2021-10-28

## 2021-07-28 ENCOUNTER — PATIENT MESSAGE (OUTPATIENT)
Dept: INTERNAL MEDICINE | Facility: CLINIC | Age: 71
End: 2021-07-28

## 2021-08-08 ENCOUNTER — PATIENT MESSAGE (OUTPATIENT)
Dept: INTERNAL MEDICINE | Facility: CLINIC | Age: 71
End: 2021-08-08

## 2021-08-09 ENCOUNTER — PATIENT MESSAGE (OUTPATIENT)
Dept: INTERNAL MEDICINE | Facility: CLINIC | Age: 71
End: 2021-08-09

## 2021-08-10 DIAGNOSIS — R35.1 BPH ASSOCIATED WITH NOCTURIA: ICD-10-CM

## 2021-08-10 DIAGNOSIS — E78.5 HYPERLIPIDEMIA, UNSPECIFIED HYPERLIPIDEMIA TYPE: ICD-10-CM

## 2021-08-10 DIAGNOSIS — N40.1 BPH ASSOCIATED WITH NOCTURIA: ICD-10-CM

## 2021-08-10 RX ORDER — MOMETASONE FUROATE 50 UG/1
SPRAY, METERED NASAL
Qty: 3 EACH | Refills: 3 | Status: SHIPPED | OUTPATIENT
Start: 2021-08-10

## 2021-08-10 RX ORDER — LOSARTAN POTASSIUM 50 MG/1
50 TABLET ORAL DAILY
Qty: 90 TABLET | Refills: 3 | Status: SHIPPED | OUTPATIENT
Start: 2021-08-10

## 2021-08-10 RX ORDER — TAMSULOSIN HYDROCHLORIDE 0.4 MG/1
0.8 CAPSULE ORAL DAILY
Qty: 180 CAPSULE | Refills: 3 | Status: SHIPPED | OUTPATIENT
Start: 2021-08-10

## 2021-08-10 RX ORDER — PRAVASTATIN SODIUM 20 MG/1
20 TABLET ORAL DAILY
Qty: 90 TABLET | Refills: 3 | Status: SHIPPED | OUTPATIENT
Start: 2021-08-10

## 2021-08-10 RX ORDER — ESOMEPRAZOLE MAGNESIUM 40 MG/1
40 CAPSULE, DELAYED RELEASE ORAL
Qty: 90 CAPSULE | Refills: 3 | Status: SHIPPED | OUTPATIENT
Start: 2021-08-10

## 2021-08-10 RX ORDER — DILTIAZEM HYDROCHLORIDE 240 MG/1
240 CAPSULE, EXTENDED RELEASE ORAL NIGHTLY
Qty: 90 CAPSULE | Refills: 3 | Status: SHIPPED | OUTPATIENT
Start: 2021-08-10

## 2021-10-04 ENCOUNTER — PATIENT MESSAGE (OUTPATIENT)
Dept: ADMINISTRATIVE | Facility: HOSPITAL | Age: 71
End: 2021-10-04

## (undated) DEVICE — DRAPE PLASTIC U 60X72

## (undated) DEVICE — GLOVE 8.5 PROTEXIS PI BLUE

## (undated) DEVICE — SEE MEDLINE ITEM 157216

## (undated) DEVICE — DRAPE EMERALD 87X114.75X113

## (undated) DEVICE — SEE MEDLINE ITEM 152739

## (undated) DEVICE — SHAVER RESECTOR 4.0

## (undated) DEVICE — SYR ONLY LUER LOCK 20CC

## (undated) DEVICE — MANIFOLD 4 PORT

## (undated) DEVICE — DECANTER VIAL ASEPTIC TRANSFER

## (undated) DEVICE — SEE MEDLINE ITEM 146231

## (undated) DEVICE — SEE MEDLINE ITEM 157117

## (undated) DEVICE — TUBING CROSSFLOW INFLOW CASS

## (undated) DEVICE — NDL SPINAL 18GX3.5 SPINOCAN

## (undated) DEVICE — ALCOHOL 70% ISOP RUBBING 4OZ

## (undated) DEVICE — SEE MEDLINE ITEM 152523

## (undated) DEVICE — PAD CAST SPECIALIST STRL 4

## (undated) DEVICE — SEE MEDLINE ITEM 157027

## (undated) DEVICE — POSITIONER HEAD DONUT 9IN FOAM

## (undated) DEVICE — SCRUB 10% POVIDONE IODINE 4OZ

## (undated) DEVICE — GLOVE 8 PROTEXIS PI BLUE

## (undated) DEVICE — GAUZE SPONGE 4X4 12PLY

## (undated) DEVICE — SUPPORT ULNA NERVE PROTECTOR

## (undated) DEVICE — SUT ETHILON 3-0 PS2 18 BLK

## (undated) DEVICE — APPLICATOR CHLORAPREP ORN 26ML

## (undated) DEVICE — SEE MEDLINE ITEM 152529

## (undated) DEVICE — GLOVE PROTEXIS HYDROGEL SZ7.5

## (undated) DEVICE — SYR 10CC LUER LOCK

## (undated) DEVICE — GLOVE PROTEXIS HYDROGEL SZ8

## (undated) DEVICE — SEE MEDLINE ITEM 146292

## (undated) DEVICE — SOL IRR NACL .9% 3000ML